# Patient Record
Sex: MALE | Race: WHITE | NOT HISPANIC OR LATINO | Employment: FULL TIME | URBAN - METROPOLITAN AREA
[De-identification: names, ages, dates, MRNs, and addresses within clinical notes are randomized per-mention and may not be internally consistent; named-entity substitution may affect disease eponyms.]

---

## 2017-05-09 ENCOUNTER — ALLSCRIPTS OFFICE VISIT (OUTPATIENT)
Dept: OTHER | Facility: OTHER | Age: 37
End: 2017-05-09

## 2017-05-09 DIAGNOSIS — R07.89 OTHER CHEST PAIN: ICD-10-CM

## 2017-05-09 DIAGNOSIS — R68.82 DECREASED LIBIDO: ICD-10-CM

## 2017-05-09 DIAGNOSIS — R06.09 OTHER FORMS OF DYSPNEA: ICD-10-CM

## 2017-05-09 DIAGNOSIS — F41.9 ANXIETY DISORDER: ICD-10-CM

## 2017-05-13 LAB
A/G RATIO (HISTORICAL): 1.3 (CALC) (ref 1–2.5)
ALBUMIN SERPL BCP-MCNC: 4.5 G/DL (ref 3.6–5.1)
ALP SERPL-CCNC: 92 U/L (ref 40–115)
ALT SERPL W P-5'-P-CCNC: 35 U/L (ref 9–46)
AST SERPL W P-5'-P-CCNC: 26 U/L (ref 10–40)
BILIRUB SERPL-MCNC: 0.7 MG/DL (ref 0.2–1.2)
BUN SERPL-MCNC: 13 MG/DL (ref 7–25)
BUN/CREA RATIO (HISTORICAL): NORMAL (CALC) (ref 6–22)
CALCIUM (ADJUSTED FOR ALBUMIN) (HISTORICAL): 9.2 MG/DL (CALC) (ref 8.6–10.2)
CALCIUM SERPL-MCNC: 9.3 MG/DL (ref 8.6–10.3)
CHLORIDE SERPL-SCNC: 102 MMOL/L (ref 98–110)
CHOLEST SERPL-MCNC: 143 MG/DL (ref 125–200)
CHOLEST/HDLC SERPL: 4.1 (CALC)
CO2 SERPL-SCNC: 27 MMOL/L (ref 20–31)
CREAT SERPL-MCNC: 0.77 MG/DL (ref 0.6–1.35)
DEPRECATED RDW RBC AUTO: 14.4 % (ref 11–15)
EGFR AFRICAN AMERICAN (HISTORICAL): 133 ML/MIN/1.73M2
EGFR-AMERICAN CALC (HISTORICAL): 115 ML/MIN/1.73M2
GAMMA GLOBULIN (HISTORICAL): 3.5 G/DL (CALC) (ref 1.9–3.7)
GLUCOSE (HISTORICAL): 96 MG/DL (ref 65–99)
HCT VFR BLD AUTO: 44.7 % (ref 38.5–50)
HDLC SERPL-MCNC: 35 MG/DL
HGB BLD-MCNC: 14.6 G/DL (ref 13.2–17.1)
LDL CHOLESTEROL (HISTORICAL): 92 MG/DL (CALC)
MCH RBC QN AUTO: 28.1 PG (ref 27–33)
MCHC RBC AUTO-ENTMCNC: 32.6 G/DL (ref 32–36)
MCV RBC AUTO: 86.3 FL (ref 80–100)
NON-HDL-CHOL (CHOL-HDL) (HISTORICAL): 108 MG/DL (CALC)
PLATELET # BLD AUTO: 238 THOUSAND/UL (ref 140–400)
PMV BLD AUTO: 10.2 FL (ref 7.5–12.5)
POTASSIUM SERPL-SCNC: 4.1 MMOL/L (ref 3.5–5.3)
RBC # BLD AUTO: 5.17 MILLION/UL (ref 4.2–5.8)
SODIUM SERPL-SCNC: 138 MMOL/L (ref 135–146)
TESTOSTERONE FREE (HISTORICAL): 48.7 PG/ML (ref 35–155)
TESTOSTERONE TOTAL (HISTORICAL): 283 NG/DL (ref 250–1100)
TOTAL PROTEIN (HISTORICAL): 8 G/DL (ref 6.1–8.1)
TRIGL SERPL-MCNC: 79 MG/DL
TSH SERPL DL<=0.05 MIU/L-ACNC: 1.29 MIU/L (ref 0.4–4.5)
WBC # BLD AUTO: 4.6 THOUSAND/UL (ref 3.8–10.8)

## 2017-05-16 ENCOUNTER — GENERIC CONVERSION - ENCOUNTER (OUTPATIENT)
Dept: OTHER | Facility: OTHER | Age: 37
End: 2017-05-16

## 2017-08-10 ENCOUNTER — ALLSCRIPTS OFFICE VISIT (OUTPATIENT)
Dept: OTHER | Facility: OTHER | Age: 37
End: 2017-08-10

## 2017-08-21 ENCOUNTER — ALLSCRIPTS OFFICE VISIT (OUTPATIENT)
Dept: OTHER | Facility: OTHER | Age: 37
End: 2017-08-21

## 2017-08-25 LAB
CLINICAL COMMENT (HISTORICAL): NORMAL
HEPATITIS A IGM ANTIBODY (HISTORICAL): NORMAL
HEPATITIS B CORE TOTAL ANTIBODY (HISTORICAL): NORMAL
HEPATITIS B SURFACE ANTIGEN (HISTORICAL): NORMAL
HEPATITIS C ANTIBODY (HISTORICAL): NORMAL
HIV AG/AB, 4TH GEN (HISTORICAL): NORMAL
RPR SCREEN (HISTORICAL): NORMAL
SIGNAL TO CUT-OFF (HISTORICAL): 0.09

## 2017-08-27 ENCOUNTER — GENERIC CONVERSION - ENCOUNTER (OUTPATIENT)
Dept: OTHER | Facility: OTHER | Age: 37
End: 2017-08-27

## 2017-09-01 ENCOUNTER — GENERIC CONVERSION - ENCOUNTER (OUTPATIENT)
Dept: OTHER | Facility: OTHER | Age: 37
End: 2017-09-01

## 2018-01-09 NOTE — MISCELLANEOUS
Provider Comments  Provider Comments:   [facility] CELL PHONE ASKED PT TO CALL BACK IF NEEDS TO RESCHEDULE APPT      Signatures   Electronically signed by : CHEKO Mcguire;  Aug 10 2017  8:51PM EST                       (Author)

## 2018-01-12 NOTE — RESULT NOTES
Verified Results  (Q) HIV AB, HIV 1/HIV 2, WESTERN BLOT/IMMUNOBLOT [id number] 12:00AM Caleb Ivan     Test Name Result Flag Reference   HIV AG/AB, 4TH GEN NON-REACTIVE  NON-REACTIVE   HIV-1 antigen and HIV-1/HIV-2 antibodies were not  detected  There is no laboratory evidence of HIV  infection  PLEASE NOTE: This information has been disclosed to  you from records whose confidentiality may be  protected by state law  If your state requires such  protection, then the state law prohibits you from  making any further disclosure of the information  without the specific written consent of the person  to whom it pertains, or as otherwise permitted by law  A general authorization for the release of medical or  other information is NOT sufficient for this purpose  For additional information please refer to  http://CompleteSet/faq/QVS809  (This link is being provided for informational/  educational purposes only )        The performance of this assay has not been clinically  validated in patients less than 3years old  (Q) ACUTE HEPATITIS PANEL (REFL) [id number] 12:00AM Bryan Cintron     Test Name Result Flag Reference   HEPATITIS A IGM NON-REACTIVE  NON-REACTIVE   HEPATITIS B SURFACE ANTIGEN NON-REACTIVE  NON-REACTIVE   HEPATITIS B CORE$ANTIBODY (IGM) NON-REACTIVE  NON-REACTIVE   HEPATITIS C ANTIBODY (REFL) NON-REACTIVE  NON-REACTIVE   SIGNAL TO CUT-OFF 0 09  <1 00   REFLEX TIQ      OUR RECORDS INDICATE THAT YOU HAVE ORDERED ACUTE HEPATITIS PANEL  ORDER CODE 87031  THIS IS A REFLEX-SPECIFIC ORDER CODE  HOWEVER, ONLY THE INITIAL TEST WAS PERFORMED, BECAUSE WE DO NOT  HAVE A REFLEX TESTING AUTHORIZATION FORM ON FILE FOR YOU  TO   PERFORM A REFLEX TEST WE NEED YOU TO SIGN AN AUTHORIZATION FORM   SPECIFYING (A) THE REFLEXIVE TEST AND (B) THE RESULTS THAT WILL   TRIGGER THE PERFORMANCE OF THE REFLEX TEST    PLEASE CONTACT THE    AT dVisit IF YOU WOULD LIKE ADDITIONAL TESTING DONE OR CONTACT YOUR    TO OBTAIN A COPY OF THE REFLEXIVE TESTING AUTHORIZATION FORM  (Q) RPR (DX) W/REFL TITER AND CONFIRMATORY TESTING [id number] 12:00AM Missy Poole     Test Name Result Flag Reference   RPR (DX) W/REFL TITER AND$CONFIRMATORY TESTING NON-REACTIVE  NON-REACTIVE     (Q) CHLAMYDIA/N  GONORRHOEAE DNA, SDA [id number] 12:00AM Caleb Ivan     Test Name Result Flag Reference   CHLAMYDIA TRACHOMATIS$RNA, TMA NOT DETECTED  NOT DETECTED   NEISSERIA [address], TMA NOT DETECTED  NOT DETECTED   This test was performed using the [address]  ([address] )  The analytical performance characteristics of this   assay, when used to test SurePath specimens have  been determined by [employer]

## 2018-01-13 VITALS
DIASTOLIC BLOOD PRESSURE: 90 MMHG | OXYGEN SATURATION: 99 % | RESPIRATION RATE: 20 BRPM | TEMPERATURE: 97.7 F | SYSTOLIC BLOOD PRESSURE: 132 MMHG | BODY MASS INDEX: 29.59 KG/M2 | HEART RATE: 70 BPM | WEIGHT: 195.25 LBS | HEIGHT: 68 IN

## 2018-01-13 NOTE — RESULT NOTES
Verified Results  (Q) CBC (H/H, RBC, INDICES, WBC, PLT) [id number] 12:00AM [facility]     Test Name Result Flag Reference   WHITE BLOOD CELL COUNT 4 6 Thousand/uL  3 8-10 8   RED BLOOD CELL COUNT 5 17 Million/uL  4 20-5 80   HEMOGLOBIN 14 6 g/dL  13 2-17 1   HEMATOCRIT 44 7 %  38 5-50 0   MCV 86 3 fL  80 0-100 0   MCH 28 1 pg  27 0-33 0   MCHC 32 6 g/dL  32 0-36 0   RDW 14 4 %  11 0-15 0   PLATELET COUNT 763 Thousand/uL  140-400   MPV 10 2 fL  7 5-12 5     (Q) COMPREHENSIVE METABOLIC PNL W/ADJUSTED CALCIUM [id number] 12:00AM [facility]     Test Name Result Flag Reference   GLUCOSE 96 mg/dL  65-99   Fasting reference interval   UREA NITROGEN (BUN) 13 mg/dL  7-25   CREATININE 0 77 mg/dL  0 60-1 35   eGFR NON-AFR  AMERICAN 115 mL/min/1 73m2  > OR = 60   eGFR AFRICAN AMERICAN 133 mL/min/1 73m2  > OR = 60   BUN/CREATININE RATIO   8-00   NOT APPLICABLE (calc)   SODIUM 138 mmol/L  135-146   POTASSIUM 4 1 mmol/L  3 5-5 3   CHLORIDE 102 mmol/L     CARBON DIOXIDE 27 mmol/L  20-31   CALCIUM 9 3 mg/dL  8 6-10 3   CALCIUM (ADJUSTED FOR$ALBUMIN) 9 2 mg/dL (calc)  8 6-10 2   PROTEIN, TOTAL 8 0 g/dL  6 1-8 1   ALBUMIN 4 5 g/dL  3 6-5 1   GLOBULIN 3 5 g/dL (calc)  1 9-3 7   ALBUMIN/GLOBULIN RATIO 1 3 (calc)  1 0-2 5   BILIRUBIN, TOTAL 0 7 mg/dL  0 2-1 2   ALKALINE PHOSPHATASE 92 U/L     AST 26 U/L  10-40   ALT 35 U/L  9-46     (Q) LIPID PANEL WITH REFLEX TO DIRECT LDL [id number] 12:00AM [facility]     Test Name Result Flag Reference   CHOLESTEROL, TOTAL 143 mg/dL  125-200   HDL CHOLESTEROL 35 mg/dL L > OR = 40   TRIGLICERIDES 79 mg/dL  <851   LDL-CHOLESTEROL 92 mg/dL (calc)  <130   Desirable range <100 mg/dL for patients with CHD or  diabetes and <70 mg/dL for diabetic patients with  known heart disease  CHOL/HDLC RATIO 4 1 (calc)  < OR = 5 0   NON HDL CHOLESTEROL 108 mg/dL (calc)     Target for non-HDL cholesterol is 30 mg/dL higher than   LDL cholesterol target       (Q) TSH, 3RD GENERATION W/REFLEX TO FT4 [id number] 12:00AM Sonia Venegas     Test Name Result Flag Reference   TSH W/REFLEX TO FT4 1 29 mIU/L  0 40-4 50     (Q) TESTOSTERONE, FREE AND TOTAL, LC/MS/MS [id number] 12:00AM Sonia Venegas     Test Name Result Flag Reference   TESTOSTERONE, TOTAL,$LC/MS/ ng/dL  250-1100   For more information on this test, go to  http://Rocawear/faq/  TotalTestosteroneMSMS        This test was developed and its analytical performance  characteristics have been determined by [address] [place] [address]  It has  not been cleared or approved by the [place]  Food and Drug  Administration  This assay has been validated pursuant  to the CLIA regulations and is used for clinical  purposes  FREE TESTOSTERONE 48 7 pg/mL  35 0-155 0   This test was developed and its analytical performance  characteristics have been determined by [address] [place] [address]  It has  not been cleared or approved by the [place]  Food and Drug  Administration  This assay has been validated pursuant  to the CLIA regulations and is used for clinical  purposes

## 2018-01-14 VITALS
RESPIRATION RATE: 15 BRPM | WEIGHT: 204.5 LBS | TEMPERATURE: 97.5 F | HEIGHT: 68 IN | SYSTOLIC BLOOD PRESSURE: 122 MMHG | DIASTOLIC BLOOD PRESSURE: 82 MMHG | BODY MASS INDEX: 30.99 KG/M2 | HEART RATE: 66 BPM | OXYGEN SATURATION: 97 %

## 2018-01-22 VITALS
HEIGHT: 68 IN | DIASTOLIC BLOOD PRESSURE: 90 MMHG | BODY MASS INDEX: 28.98 KG/M2 | TEMPERATURE: 99 F | OXYGEN SATURATION: 97 % | WEIGHT: 191.19 LBS | RESPIRATION RATE: 18 BRPM | SYSTOLIC BLOOD PRESSURE: 130 MMHG | HEART RATE: 79 BPM

## 2018-02-12 DIAGNOSIS — N52.9 ERECTILE DYSFUNCTION, UNSPECIFIED ERECTILE DYSFUNCTION TYPE: Primary | ICD-10-CM

## 2018-02-12 RX ORDER — SILDENAFIL 100 MG/1
100 TABLET, FILM COATED ORAL AS NEEDED
Qty: 8 TABLET | Refills: 0 | Status: SHIPPED | OUTPATIENT
Start: 2018-02-12 | End: 2018-03-28 | Stop reason: SDUPTHER

## 2018-02-12 RX ORDER — SILDENAFIL 100 MG/1
TABLET, FILM COATED ORAL
COMMUNITY
Start: 2017-09-01 | End: 2018-02-12 | Stop reason: SDUPTHER

## 2018-03-28 DIAGNOSIS — N52.9 ERECTILE DYSFUNCTION, UNSPECIFIED ERECTILE DYSFUNCTION TYPE: ICD-10-CM

## 2018-03-28 RX ORDER — SILDENAFIL 100 MG/1
100 TABLET, FILM COATED ORAL AS NEEDED
Qty: 8 TABLET | Refills: 0 | Status: SHIPPED | OUTPATIENT
Start: 2018-03-28 | End: 2018-11-16 | Stop reason: SDUPTHER

## 2018-08-01 DIAGNOSIS — N52.9 ERECTILE DYSFUNCTION, UNSPECIFIED ERECTILE DYSFUNCTION TYPE: ICD-10-CM

## 2018-08-02 RX ORDER — SILDENAFIL 100 MG/1
100 TABLET, FILM COATED ORAL AS NEEDED
Qty: 8 TABLET | Refills: 0 | Status: SHIPPED | OUTPATIENT
Start: 2018-08-02 | End: 2019-01-07 | Stop reason: SDUPTHER

## 2018-10-14 ENCOUNTER — TELEPHONE (OUTPATIENT)
Dept: FAMILY MEDICINE CLINIC | Facility: CLINIC | Age: 38
End: 2018-10-14

## 2018-10-14 NOTE — TELEPHONE ENCOUNTER
Neda Mara is scheduled for an annual physical on 10/24/2018 with you  He is asking if you can please place the orders so he may have fasting blood work prior to his appointment  Pt will being going to the [facility]'s lab on [address] once orders are placed please respond so we may call pt and advise him his order have been placed he does need to go on a Sunday morning   Pt's best number is [phone]

## 2018-10-16 DIAGNOSIS — Z13.220 LIPID SCREENING: ICD-10-CM

## 2018-10-16 DIAGNOSIS — R53.83 FATIGUE, UNSPECIFIED TYPE: Primary | ICD-10-CM

## 2018-10-16 PROBLEM — R68.82 REDUCED LIBIDO: Status: ACTIVE | Noted: 2017-05-09

## 2018-10-16 PROBLEM — R07.89 ATYPICAL CHEST PAIN: Status: ACTIVE | Noted: 2017-05-09

## 2018-10-16 PROBLEM — R06.09 DYSPNEA ON EXERTION: Status: ACTIVE | Noted: 2017-05-09

## 2018-10-16 PROBLEM — R07.89 CHEST TIGHTNESS OR PRESSURE: Status: ACTIVE | Noted: 2017-05-09

## 2018-10-16 PROBLEM — R29.898 COMPLAINTS OF WEAKNESS OF LOWER EXTREMITY: Status: ACTIVE | Noted: 2017-05-11

## 2018-10-16 PROBLEM — R06.00 DYSPNEA ON EXERTION: Status: ACTIVE | Noted: 2017-05-09

## 2018-10-16 PROBLEM — N52.9 MALE ERECTILE DYSFUNCTION: Status: ACTIVE | Noted: 2017-09-01

## 2018-10-16 PROBLEM — F41.9 ANXIETY: Status: ACTIVE | Noted: 2017-05-09

## 2018-10-17 NOTE — TELEPHONE ENCOUNTER
I called Kayla Leone  and advised his fasting blood work orders have been placed and pt preferred to push his physical back a week to make sure Lottie Izaguirre will have the results in time  Pt is scheduled for physical on 11/01/2018 with Caleb at 4:30 pm  Pt was asked to arrive 15 minutes prior

## 2018-10-23 ENCOUNTER — TELEPHONE (OUTPATIENT)
Dept: FAMILY MEDICINE CLINIC | Facility: CLINIC | Age: 38
End: 2018-10-23

## 2018-10-23 NOTE — TELEPHONE ENCOUNTER
----- Message from Carlos Soria sent at 10/22/2018  2:12 PM EDT -----  Regarding: Appointment Change  Please call patient  Iesha Daughters has to leave by 4:30pm on 11/01/18  Can patient come in at 4:00 for his appointment? I did already adjust on the schedule

## 2018-10-23 NOTE — TELEPHONE ENCOUNTER
Called pt, GOYO asking him to call back and confirm appointment time can be changed on 11/1 from 4:30 to 4pm

## 2018-10-30 ENCOUNTER — TELEPHONE (OUTPATIENT)
Dept: FAMILY MEDICINE CLINIC | Facility: CLINIC | Age: 38
End: 2018-10-30

## 2018-10-30 NOTE — TELEPHONE ENCOUNTER
----- Message from Anuel Eng sent at 10/30/2018  1:33 PM EDT -----  Regarding: Appointment Rescheudule  Patient is scheduled on Thursday with Diann Princeo - He will not be in the office on Thursday afternoon  Please reschedule his appointment  Any of the above

## 2018-11-04 ENCOUNTER — APPOINTMENT (OUTPATIENT)
Dept: LAB | Facility: MEDICAL CENTER | Age: 38
End: 2018-11-04
Payer: COMMERCIAL

## 2018-11-04 DIAGNOSIS — R53.83 FATIGUE, UNSPECIFIED TYPE: ICD-10-CM

## 2018-11-04 DIAGNOSIS — Z13.220 LIPID SCREENING: ICD-10-CM

## 2018-11-04 LAB
ALBUMIN SERPL BCP-MCNC: 4.1 G/DL (ref 3.5–5)
ALP SERPL-CCNC: 65 U/L (ref 46–116)
ALT SERPL W P-5'-P-CCNC: 45 U/L (ref 12–78)
ANION GAP SERPL CALCULATED.3IONS-SCNC: 4 MMOL/L (ref 4–13)
AST SERPL W P-5'-P-CCNC: 26 U/L (ref 5–45)
BASOPHILS # BLD AUTO: 0.04 THOUSANDS/ΜL (ref 0–0.1)
BASOPHILS NFR BLD AUTO: 1 % (ref 0–1)
BILIRUB SERPL-MCNC: 0.49 MG/DL (ref 0.2–1)
BUN SERPL-MCNC: 22 MG/DL (ref 5–25)
CALCIUM SERPL-MCNC: 8.9 MG/DL (ref 8.3–10.1)
CHLORIDE SERPL-SCNC: 101 MMOL/L (ref 100–108)
CHOLEST SERPL-MCNC: 167 MG/DL (ref 50–200)
CO2 SERPL-SCNC: 30 MMOL/L (ref 21–32)
CREAT SERPL-MCNC: 0.9 MG/DL (ref 0.6–1.3)
EOSINOPHIL # BLD AUTO: 0.27 THOUSAND/ΜL (ref 0–0.61)
EOSINOPHIL NFR BLD AUTO: 5 % (ref 0–6)
ERYTHROCYTE [DISTWIDTH] IN BLOOD BY AUTOMATED COUNT: 12.9 % (ref 11.6–15.1)
GFR SERPL CREATININE-BSD FRML MDRD: 107 ML/MIN/1.73SQ M
GLUCOSE P FAST SERPL-MCNC: 92 MG/DL (ref 65–99)
HCT VFR BLD AUTO: 44.1 % (ref 36.5–49.3)
HDLC SERPL-MCNC: 48 MG/DL (ref 40–60)
HGB BLD-MCNC: 14.6 G/DL (ref 12–17)
IMM GRANULOCYTES # BLD AUTO: 0.01 THOUSAND/UL (ref 0–0.2)
IMM GRANULOCYTES NFR BLD AUTO: 0 % (ref 0–2)
LDLC SERPL CALC-MCNC: 105 MG/DL (ref 0–100)
LYMPHOCYTES # BLD AUTO: 1.88 THOUSANDS/ΜL (ref 0.6–4.47)
LYMPHOCYTES NFR BLD AUTO: 32 % (ref 14–44)
MCH RBC QN AUTO: 29 PG (ref 26.8–34.3)
MCHC RBC AUTO-ENTMCNC: 33.1 G/DL (ref 31.4–37.4)
MCV RBC AUTO: 88 FL (ref 82–98)
MONOCYTES # BLD AUTO: 0.44 THOUSAND/ΜL (ref 0.17–1.22)
MONOCYTES NFR BLD AUTO: 7 % (ref 4–12)
NEUTROPHILS # BLD AUTO: 3.27 THOUSANDS/ΜL (ref 1.85–7.62)
NEUTS SEG NFR BLD AUTO: 55 % (ref 43–75)
NONHDLC SERPL-MCNC: 119 MG/DL
NRBC BLD AUTO-RTO: 0 /100 WBCS
PLATELET # BLD AUTO: 220 THOUSANDS/UL (ref 149–390)
PMV BLD AUTO: 10.6 FL (ref 8.9–12.7)
POTASSIUM SERPL-SCNC: 4 MMOL/L (ref 3.5–5.3)
PROT SERPL-MCNC: 8.5 G/DL (ref 6.4–8.2)
RBC # BLD AUTO: 5.03 MILLION/UL (ref 3.88–5.62)
SODIUM SERPL-SCNC: 135 MMOL/L (ref 136–145)
TRIGL SERPL-MCNC: 68 MG/DL
TSH SERPL DL<=0.05 MIU/L-ACNC: 1.96 UIU/ML (ref 0.36–3.74)
WBC # BLD AUTO: 5.91 THOUSAND/UL (ref 4.31–10.16)

## 2018-11-04 PROCEDURE — 85025 COMPLETE CBC W/AUTO DIFF WBC: CPT

## 2018-11-04 PROCEDURE — 36415 COLL VENOUS BLD VENIPUNCTURE: CPT

## 2018-11-04 PROCEDURE — 84443 ASSAY THYROID STIM HORMONE: CPT

## 2018-11-04 PROCEDURE — 80053 COMPREHEN METABOLIC PANEL: CPT

## 2018-11-04 PROCEDURE — 80061 LIPID PANEL: CPT

## 2018-11-16 ENCOUNTER — OFFICE VISIT (OUTPATIENT)
Dept: FAMILY MEDICINE CLINIC | Facility: CLINIC | Age: 38
End: 2018-11-16
Payer: COMMERCIAL

## 2018-11-16 VITALS
RESPIRATION RATE: 16 BRPM | SYSTOLIC BLOOD PRESSURE: 116 MMHG | BODY MASS INDEX: 30.4 KG/M2 | HEART RATE: 74 BPM | DIASTOLIC BLOOD PRESSURE: 88 MMHG | WEIGHT: 200.6 LBS | HEIGHT: 68 IN | TEMPERATURE: 99.6 F | OXYGEN SATURATION: 98 %

## 2018-11-16 DIAGNOSIS — Z00.00 ROUTINE ADULT HEALTH MAINTENANCE: Primary | ICD-10-CM

## 2018-11-16 DIAGNOSIS — G47.30 SLEEP APNEA, UNSPECIFIED TYPE: ICD-10-CM

## 2018-11-16 DIAGNOSIS — M25.50 ARTHRALGIA, UNSPECIFIED JOINT: ICD-10-CM

## 2018-11-16 PROCEDURE — 99395 PREV VISIT EST AGE 18-39: CPT | Performed by: FAMILY MEDICINE

## 2018-11-19 DIAGNOSIS — G47.30 SLEEP APNEA, UNSPECIFIED TYPE: Primary | ICD-10-CM

## 2018-11-20 NOTE — PROGRESS NOTES
Assessment/Plan:  Patient's physical exam is fairly unremarkable  He appears to be in good health however his sleep issues need to be further evaluated  We will send for a sleep study to rule out obstructive sleep apnea  Secondly the etiology of his joint pain is unclear  Will check routine blood work for connective tissue disorders  He is up-to-date on immunizations and is not due for any age-related screenings at this time  Diagnoses and all orders for this visit:    Routine adult health maintenance    Sleep apnea, unspecified type  -     Home Study; Future    Arthralgia, unspecified joint  -     JUAN LUIS Screen w/ Reflex to Titer/Pattern; Future  -     C-reactive protein; Future  -     Lyme Antibody Profile with reflex to WB; Future  -     RF Screen w/ Reflex to Titer; Future  -     Sedimentation rate, automated; Future  -     Uric acid; Future          Subjective:      Patient ID: Karen Carrillo is a 43 y o  male  Karen Carrillo presents for health maintenance visit   43 y o   male    He/she states that  level of health is:  good     Dental issues :no    Vision issues:  Wears glasses    Hearing issues: no    Up-to-date on immunizations: yes    Diet: fair     Exercise:  intermittently    Tobacco: no    ETOH: Minimal     Illegal drugs: no    Patient presents with his wife for routine annual physical   Overall he feels his health is fairly good though he does have 2 concerns  First there issues with his sleeping  His wife notes that he snores loudly and she believe she has witnessed several apneic episodes  He does have daytime somnolence  Secondly he has over the last 3-6 months developed progressive arthralgias in multiple joints  He has no swelling no rashes and no unexplained fevers          The following portions of the patient's history were reviewed and updated as appropriate: allergies, current medications, past family history, past medical history, past social history, past surgical history and problem list     Review of Systems   Constitutional: Positive for fatigue  HENT: Negative  Negative for congestion, ear pain, hearing loss, nosebleeds, sore throat and trouble swallowing  Eyes: Negative  Respiratory: Negative for apnea, cough, chest tightness, shortness of breath and wheezing  Cardiovascular: Negative  Gastrointestinal: Negative for abdominal pain, blood in stool, constipation, diarrhea, nausea and vomiting  Endocrine: Negative  Genitourinary: Negative for difficulty urinating, dysuria, frequency, hematuria and urgency  Musculoskeletal: Positive for arthralgias  Negative for joint swelling and myalgias  Skin: Negative for rash  Neurological: Negative for dizziness, syncope, light-headedness, numbness and headaches  Hematological: Negative  Psychiatric/Behavioral: Negative for confusion and dysphoric mood  The patient is not nervous/anxious  Objective:      /88 (BP Location: Left arm, Patient Position: Sitting, Cuff Size: Large)   Pulse 74   Temp 99 6 °F (37 6 °C) (Tympanic)   Resp 16   Ht 5' 7 5" (1 715 m)   Wt 91 kg (200 lb 9 6 oz)   SpO2 98%   BMI 30 95 kg/m²          Physical Exam   Constitutional: He is oriented to person, place, and time  He appears well-developed and well-nourished  HENT:   Head: Normocephalic and atraumatic  Right Ear: Hearing, tympanic membrane and external ear normal    Left Ear: Hearing, tympanic membrane, external ear and ear canal normal    Nose: Nose normal  No rhinorrhea or nasal deformity  Right sinus exhibits no maxillary sinus tenderness and no frontal sinus tenderness  Left sinus exhibits no maxillary sinus tenderness and no frontal sinus tenderness  Mouth/Throat: Uvula is midline, oropharynx is clear and moist and mucous membranes are normal  No oral lesions  Normal dentition  No oropharyngeal exudate or posterior oropharyngeal erythema  Eyes: Pupils are equal, round, and reactive to light  Conjunctivae, EOM and lids are normal    Neck: Trachea normal and normal range of motion  Neck supple  No JVD present  Carotid bruit is not present  No thyroid mass and no thyromegaly present  Cardiovascular: Normal rate, regular rhythm, intact distal pulses and normal pulses  No murmur heard  Pulses:       Carotid pulses are 2+ on the right side, and 2+ on the left side  Radial pulses are 2+ on the right side, and 2+ on the left side  Pulmonary/Chest: Breath sounds normal  No accessory muscle usage  No respiratory distress  Abdominal: Soft  Normal appearance, normal aorta and bowel sounds are normal  He exhibits no distension, no ascites and no mass  There is no hepatosplenomegaly  There is no tenderness  There is no CVA tenderness  No hernia  Musculoskeletal: Normal range of motion  He exhibits no tenderness or deformity  Lymphadenopathy:     He has no cervical adenopathy  Neurological: He is alert and oriented to person, place, and time  He has normal strength and normal reflexes  No cranial nerve deficit or sensory deficit  He displays a negative Romberg sign  Skin: Skin is warm, dry and intact  No lesion and no rash noted  Psychiatric: He has a normal mood and affect  His speech is normal and behavior is normal  Judgment and thought content normal  Cognition and memory are normal    Nursing note and vitals reviewed

## 2018-11-23 ENCOUNTER — HOSPITAL ENCOUNTER (OUTPATIENT)
Dept: SLEEP CENTER | Facility: CLINIC | Age: 38
Discharge: HOME | End: 2018-11-23
Payer: COMMERCIAL

## 2018-11-23 ENCOUNTER — APPOINTMENT (OUTPATIENT)
Dept: LAB | Facility: MEDICAL CENTER | Age: 38
End: 2018-11-23
Payer: COMMERCIAL

## 2018-11-23 DIAGNOSIS — M25.50 ARTHRALGIA, UNSPECIFIED JOINT: ICD-10-CM

## 2018-11-23 DIAGNOSIS — G47.30 SLEEP APNEA, UNSPECIFIED TYPE: ICD-10-CM

## 2018-11-23 LAB
CRP SERPL QL: <3 MG/L
ERYTHROCYTE [SEDIMENTATION RATE] IN BLOOD: 8 MM/HOUR (ref 0–10)
URATE SERPL-MCNC: 6.5 MG/DL (ref 4.2–8)

## 2018-11-23 PROCEDURE — 84550 ASSAY OF BLOOD/URIC ACID: CPT

## 2018-11-23 PROCEDURE — 85652 RBC SED RATE AUTOMATED: CPT

## 2018-11-23 PROCEDURE — 86038 ANTINUCLEAR ANTIBODIES: CPT

## 2018-11-23 PROCEDURE — 36415 COLL VENOUS BLD VENIPUNCTURE: CPT

## 2018-11-23 PROCEDURE — 86618 LYME DISEASE ANTIBODY: CPT

## 2018-11-23 PROCEDURE — G0399 HOME SLEEP TEST/TYPE 3 PORTA: HCPCS

## 2018-11-23 PROCEDURE — 86430 RHEUMATOID FACTOR TEST QUAL: CPT

## 2018-11-23 PROCEDURE — 86140 C-REACTIVE PROTEIN: CPT

## 2018-11-26 LAB
B BURGDOR IGG SER IA-ACNC: 0.17
B BURGDOR IGM SER IA-ACNC: 0.23
RHEUMATOID FACT SER QL LA: NEGATIVE
RYE IGE QN: NEGATIVE

## 2018-11-29 ENCOUNTER — TELEPHONE (OUTPATIENT)
Dept: SLEEP CENTER | Facility: CLINIC | Age: 38
End: 2018-11-29

## 2018-11-29 NOTE — TELEPHONE ENCOUNTER
Left message that home sleep study is resulted and for patient to call and make NP consult appt with Dr Mak Lester

## 2018-12-06 ENCOUNTER — OFFICE VISIT (OUTPATIENT)
Dept: FAMILY MEDICINE CLINIC | Facility: CLINIC | Age: 38
End: 2018-12-06
Payer: COMMERCIAL

## 2018-12-06 VITALS
OXYGEN SATURATION: 94 % | DIASTOLIC BLOOD PRESSURE: 88 MMHG | RESPIRATION RATE: 15 BRPM | SYSTOLIC BLOOD PRESSURE: 132 MMHG | BODY MASS INDEX: 31.83 KG/M2 | WEIGHT: 202.8 LBS | TEMPERATURE: 99.2 F | HEIGHT: 67 IN | HEART RATE: 94 BPM

## 2018-12-06 DIAGNOSIS — A08.4 VIRAL GASTROENTERITIS: Primary | ICD-10-CM

## 2018-12-06 PROCEDURE — 99213 OFFICE O/P EST LOW 20 MIN: CPT | Performed by: NURSE PRACTITIONER

## 2018-12-06 NOTE — LETTER
December 6, 2018     Patient: Alma Fortune   YOB: 1980   Date of Visit: 12/6/2018       To Whom it May Concern:    Andrea Hooks is under my professional care  He was seen in my office on 12/6/2018  He may return on 12/8/2018  If you have any questions or concerns, please don't hesitate to call           Sincerely,          CLINT Simmons        CC: No Recipients

## 2018-12-06 NOTE — LETTER
December 6, 2018     Patient: Hellen Butts   YOB: 1980   Date of Visit: 12/6/2018       To Whom it May Concern:    Mary Jo Deejay is under my professional care  He was seen in my office on 12/6/2018  He may return on 12/7/2018  If you have any questions or concerns, please don't hesitate to call           Sincerely,          CLINT Syed        CC: No Recipients

## 2018-12-08 NOTE — PROGRESS NOTES
[facility]    ASSESSMENT AND PLAN     1  Viral gastroenteritis  Patient presents today with signs and symptoms suggestive of a viral gastroenteritis  Symptom management reviewed: Increased fluids/maintain hydration, rest, BRAT diet, may take Tylenol and/or ibuprofen for headache  Patient declined Rx for Zofran  Patient missed work today, note given  He is to return to the office if his symptoms persist or worsen  SUBJECTIVE       Patient ID: Nancy Jimenez is a 43 y o  male  Chief Complaint   Patient presents with    Vomiting     Pt c/o vomiting, headaches with low grade fevers  Pt states sxs started at 1:30 am today  Pt denies diarrhea  HISTORY OF PRESENT ILLNESS    Patient presents today with complaint of GI distress, vomiting, headache, and low-grade fever that started during the night  He denies vomiting, constipation and/or diarrhea  He does not recall eating anything unusual yesterday  Several coworkers have been sick recently  He called out of work today  The following portions of the patient's history were reviewed and updated as appropriate: allergies, current medications and past medical history  REVIEW OF SYSTEMS  Review of Systems   Constitutional: Positive for fever  Negative for chills and diaphoresis  HENT: Negative  Respiratory: Negative  Cardiovascular: Negative  Gastrointestinal: Positive for abdominal pain, nausea and vomiting  Negative for abdominal distention, constipation, diarrhea and rectal pain  Genitourinary: Negative  Musculoskeletal: Negative  Skin: Negative  Neurological: Positive for headaches  Negative for dizziness and light-headedness  Psychiatric/Behavioral: Negative          OBJECTIVE      VITAL SIGNS  /88 (BP Location: Left arm, Patient Position: Sitting, Cuff Size: Adult)   Pulse 94   Temp 99 2 °F (37 3 °C) (Tympanic)   Resp 15   Ht 5' 6 73" (1 695 m)   Wt 92 kg (202 lb 12 8 oz)   SpO2 94%   BMI 32 02 kg/m²       PHYSICAL EXAMINATION   Physical Exam   Constitutional: He appears well-developed and well-nourished  Cardiovascular: Normal rate, regular rhythm and normal heart sounds  Pulmonary/Chest: Effort normal and breath sounds normal  No respiratory distress  He has no wheezes  Abdominal: Soft  Bowel sounds are normal  He exhibits no distension  There is no tenderness  There is no rebound and no guarding  Nursing note and vitals reviewed

## 2018-12-31 ENCOUNTER — OFFICE VISIT (OUTPATIENT)
Dept: FAMILY MEDICINE CLINIC | Facility: CLINIC | Age: 38
End: 2018-12-31
Payer: COMMERCIAL

## 2018-12-31 VITALS
SYSTOLIC BLOOD PRESSURE: 132 MMHG | HEART RATE: 69 BPM | BODY MASS INDEX: 32.66 KG/M2 | DIASTOLIC BLOOD PRESSURE: 76 MMHG | TEMPERATURE: 98.3 F | RESPIRATION RATE: 17 BRPM | WEIGHT: 208.1 LBS | HEIGHT: 67 IN | OXYGEN SATURATION: 97 %

## 2018-12-31 DIAGNOSIS — J32.9 SINUSITIS, UNSPECIFIED CHRONICITY, UNSPECIFIED LOCATION: Primary | ICD-10-CM

## 2018-12-31 PROCEDURE — 99213 OFFICE O/P EST LOW 20 MIN: CPT | Performed by: NURSE PRACTITIONER

## 2018-12-31 RX ORDER — AZITHROMYCIN 250 MG/1
TABLET, FILM COATED ORAL
Qty: 6 TABLET | Refills: 0 | Status: SHIPPED | OUTPATIENT
Start: 2018-12-31 | End: 2019-01-04

## 2018-12-31 NOTE — LETTER
December 31, 2018     Patient: Lex Sauceda   YOB: 1980   Date of Visit: 12/31/2018       To Whom it May Concern:    Anabel Rojas is under my professional care  He was seen in my office on 12/31/2018  He may return on 1/2/2019  If you have any questions or concerns, please don't hesitate to call           Sincerely,          CLINT Fong        CC: No Recipients

## 2018-12-31 NOTE — PROGRESS NOTES
[facility]    ASSESSMENT AND PLAN     1  Sinusitis, unspecified chronicity, unspecified location  69-year-old male presents today with signs and symptoms suggestive of a sinusitis  Physical assessment is as documented below  Rx for azithromycin given today  Symptom management reviewed: Increased rest, increase fluids, may take OTC antitussive/expectorant, may take Tylenol and/or ibuprofen, warm salt water gargles  He is to return to the office if his symptoms persist and/or worsen  He missed work today, note given  - azithromycin (ZITHROMAX) 250 mg tablet; Take 2 tablets today then 1 tablet daily x 4 days  Dispense: 6 tablet; Refill: 0            SUBJECTIVE       Patient ID: Joselyn Calvo is a 43 y o  male  Chief Complaint   Patient presents with    Headache     x2d Pt states he took otc Allergy med and Advil w/ little relief   Sore Throat     x2d    Nasal Congestion     x2d       HISTORY OF PRESENT ILLNESS    Patient presents today with sinus pain/pressure, nasal congestion, headache, sore throat with difficulty swallowing  Symptoms started Saturday and have worsened last day or 2  He has trialed at a antihistamine and Advil with minimal relief  He had to miss work today  The following portions of the patient's history were reviewed and updated as appropriate: allergies, current medications, past medical history and problem list     REVIEW OF SYSTEMS  Review of Systems   Constitutional: Positive for fatigue  HENT: Positive for congestion, ear pain, postnasal drip, sinus pain, sinus pressure, sore throat, trouble swallowing and voice change  Negative for ear discharge  Respiratory: Negative  Cardiovascular: Negative  Gastrointestinal: Negative  Genitourinary: Negative  Neurological: Positive for headaches  Negative for dizziness and light-headedness  Psychiatric/Behavioral: Negative          OBJECTIVE      VITAL SIGNS  /76 (BP Location: Left arm, Patient Position: Sitting, Cuff Size: Standard)   Pulse 69   Temp 98 3 °F (36 8 °C) (Tympanic Core)   Resp 17   Ht 5' 7 2" (1 707 m)   Wt 94 4 kg (208 lb 1 6 oz)   SpO2 97%   BMI 32 40 kg/m²       PHYSICAL EXAMINATION   Physical Exam   Constitutional: He is oriented to person, place, and time  He appears well-developed and well-nourished  HENT:   Head: Normocephalic  Right Ear: Hearing, tympanic membrane, external ear and ear canal normal  No middle ear effusion  Left Ear: Hearing, tympanic membrane, external ear and ear canal normal   No middle ear effusion  Nose: Mucosal edema present  Right sinus exhibits maxillary sinus tenderness and frontal sinus tenderness  Left sinus exhibits maxillary sinus tenderness and frontal sinus tenderness  Mouth/Throat: Mucous membranes are dry  Oropharyngeal exudate and posterior oropharyngeal erythema present  Eyes: Right eye exhibits no discharge  Left eye exhibits no discharge  Cardiovascular: Normal rate and regular rhythm  Pulmonary/Chest: Effort normal and breath sounds normal  No respiratory distress  Musculoskeletal: Normal range of motion  Lymphadenopathy:        Head (right side): No submental and no submandibular adenopathy present  Head (left side): No submental and no submandibular adenopathy present  He has no cervical adenopathy  Neurological: He is alert and oriented to person, place, and time  Skin: Skin is warm, dry and intact  Psychiatric: He has a normal mood and affect  His speech is normal and behavior is normal  Judgment and thought content normal  Cognition and memory are normal    Nursing note and vitals reviewed

## 2019-01-07 DIAGNOSIS — N52.9 ERECTILE DYSFUNCTION, UNSPECIFIED ERECTILE DYSFUNCTION TYPE: ICD-10-CM

## 2019-01-07 RX ORDER — SILDENAFIL 100 MG/1
100 TABLET, FILM COATED ORAL AS NEEDED
Qty: 8 TABLET | Refills: 5 | Status: SHIPPED | OUTPATIENT
Start: 2019-01-07 | End: 2020-09-14 | Stop reason: ALTCHOICE

## 2019-01-18 ENCOUNTER — OFFICE VISIT (OUTPATIENT)
Dept: SLEEP CENTER | Facility: CLINIC | Age: 39
End: 2019-01-18
Payer: COMMERCIAL

## 2019-01-18 VITALS
DIASTOLIC BLOOD PRESSURE: 78 MMHG | HEIGHT: 67 IN | OXYGEN SATURATION: 97 % | BODY MASS INDEX: 32.4 KG/M2 | HEART RATE: 61 BPM | SYSTOLIC BLOOD PRESSURE: 116 MMHG

## 2019-01-18 DIAGNOSIS — F51.12 INSUFFICIENT SLEEP SYNDROME: ICD-10-CM

## 2019-01-18 DIAGNOSIS — G47.9 SLEEP DISTURBANCE: ICD-10-CM

## 2019-01-18 DIAGNOSIS — E66.9 OBESITY (BMI 30-39.9): ICD-10-CM

## 2019-01-18 DIAGNOSIS — G47.33 OBSTRUCTIVE SLEEP APNEA SYNDROME: Primary | ICD-10-CM

## 2019-01-18 DIAGNOSIS — K21.9 GASTROESOPHAGEAL REFLUX DISEASE WITHOUT ESOPHAGITIS: ICD-10-CM

## 2019-01-18 DIAGNOSIS — F45.8 BRUXISM: ICD-10-CM

## 2019-01-18 DIAGNOSIS — G47.10 HYPERSOMNIA: ICD-10-CM

## 2019-01-18 PROCEDURE — 99244 OFF/OP CNSLTJ NEW/EST MOD 40: CPT | Performed by: INTERNAL MEDICINE

## 2019-01-18 NOTE — PROGRESS NOTES
Review of Systems      Genitourinary none   Cardiology none   Gastrointestinal frequent heartburn/acid reflux   Neurology awaken with headache, need to move extremities, muscle weakness, numbness/tingling of an extremity, forgetfulness, poor concentration or confusion,  and difficulty with memory   Constitutional fatigue and weight change   Integumentary rash or dry skin and itching   Psychiatry none   Musculoskeletal joint pain, muscle aches and back pain   Pulmonary snoring and difficulty breathing when lying flat    ENT throat clearing   Endocrine none   Hematological none

## 2019-01-18 NOTE — PROGRESS NOTES
Consultation - [address]  43 y o  male  :1980  TGE:[id number]    Physician Requesting Consult: Tamara Morales DO     Reason for Consult : [At your kind request] I saw this patient for initial sleep evaluation today  A home sleep study was undertaken to evaluate for sleep disordered breathing and   patient is here to review results and further options  The study demonstrated a VAMSI (respiratory event index of) 9 4 /hour  Minimum oxygen saturation was was 81% [and 1 9% of the study was spent with saturations less than 90%  ]  The snore index was 0 4%  PFSH, Problem List, Medications & Allergies were reviewed in EMR  He  has a past medical history of Migraine with aura  He has a current medication list which includes the following prescription(s): sildenafil  HPI:  Study was undertaken because of his complaints of disrupted sleep and excessive sleepiness of several years duration  Bed partner reports loud snoring and has witnessed apneas  On occasion he has a woken himself with snoring or choking  Symptoms are improved somewhat by sleeping in the lateral position  Other Complaints: [none]  Restless Leg Syndrome: reports no suggestive symptoms    Parasomnia activity: reports teeth grinding during sleep and at times feels his acting out dream content  There is no report of sleep walking, injury to himself or bed partner  Sleep Routine: Typical Bedtime:  10:30 p m  Gets OOB:  5:00 a m   [TIB:6 5 hrs] Sleep latency:< 15 minutes Sleep Interruptions: infrequent x/night [but he moves excessively during sleep]  Awakens: with the aid of an alarm and feels not always refreshed[[email or web address] hrs] He has Excessive Daytime Sleepiness and dozes off unintentionally when sedentary  At times he is struggles with driving and would zoned out  [place] Sleepiness Scale rated at Total score: 20 /24  Habits: reports that he has never smoked   He has never used smokeless tobacco , reports that he drinks alcohol , [ reports that he does not use drugs  ],Caffeine use: excessive up until bedtime, Exercise routine: none but is physically active at work  Family History: [Negative for sleep disturbance ]  ROS: reviewed & as attached  [Significant for weight gain of around 40 lb in the past 6 months  He reports acid reflux  He reported no nasal respiratory symptoms ]  He has morning headaches but infrequently  He has musculoskeletal aches and pains  He is on Viagra for erectile dysfunction  EXAM:    Vitals /78   Pulse 61   Ht 5' 7 2" (1 707 m)   SpO2 97%   BMI 32 40 kg/m²     General  [Well] groomed male, appears stated age, in [no apparent] distress  Psychiatric  Alert and cooperative  [Mental state appears normal ] Judgement & Insight  [good]   Head   Craniofacial anatomy:narrow facies Sinuses: [non-] tender  TMJ: [Normal]     Eyes   EOM's intact, conjunctiva/corneas clear         Nasal Airway  narrow nares Septum:[central], Mucous membranes:appear [normal]  Turbinates:  are [normal ] There is [no] rhinorrhea; [No] PND     Oral   Airway   laterally narrowed Tongue:Modified Mallampati class II (hard and soft palate, upper portion of tonsils and uvula visible)  Palate:  redundant soft palateTonsils: [no] hypertrophy  Teeth: normal       Neck    is lean; Neck Circumference: 36 5 (cm)cm; Supple; [no] abnormal masses; Thyroid:[normal]  Trachea:[central]      Lymph    [No] Cervical [or] Submandibular Lymhadenopathy   Heart:    RRR; S1,S2 normal; [no] gallop; [no]murmur[s]     Lungs   Respiratory Effort:[normal]  Air entry [good] [bilaterally]  [No] wheezes  [No] rales   Abdomen   Obese, Soft & non-tender     Extremities   [No] pedal edema  [No] clubbing or cyanosis  Skin   Skin is warm and dry; Color& Hydration [good]; [no] facial rashes or lesions    Neurologic  Speech is clear and coherent  CNII-XII intact  Rombergs [Negative]      Ricardo Reyes Muscle bulk, tone and power [WNL] Gait:[normal]          IMPRESSION: Primary Sleep/Secondary(to Medical or Psych conditions) & comorbidities   1  Obstructive sleep apnea syndrome  Ambulatory referral to Sleep Medicine    Cpap DME   2  Sleep disturbance     3  Hypersomnia     4  Insufficient sleep syndrome     5  Bruxism     6  Obesity (BMI 30-39 9)     7  Gastroesophageal reflux disease without esophagitis        PLAN:   1  I reviewed results of the Sleep study with the patient  2  With respect to above conditions, I counseled on pathophysiology, diagnosis, treatment options, risks and benefits; inter-relationship and effects on symptoms and comorbidities; risks of no treatment; costs and insurance aspects  3   Patient elected positive airway pressure therapy but declined an in-lab titration study because of his work schedule and he felt he would not be able to sleep in the lab  He wanted to try auto titrating Pap  I provided a prescription with pressure to be set between 6 - 16 cm H2O  4  I advised on sleep hygiene specifically avoiding caffeine use after 3:00 p m  And alcohol use close to bedtime  4  He is also interested in surgical options and plans to see ENT  5  I advised weight reduction  6  Also advised allowing sufficient opportunity for sleep  5  Follow-up to be scheduled after the study to review results and to initiate therapy           Sincerely,     Authenticated electronically by Reshma Carlisle MD   on 05/32/13   Board Certified Specialist

## 2019-01-18 NOTE — PATIENT INSTRUCTIONS
What is MARY? Obstructive sleep apnea is a common and serious sleep disorder that causes you to stop breathing during sleep  The airway repeatedly becomes blocked, limiting the amount of air that reaches your lungs  When this happens, you may snore loudly or making choking noises as you try to breathe  Your brain and body becomes oxygen deprived and you may wake up  This may happen a few times a night, or in more severe cases, several hundred times a night  Sleep apnea can make you wake up in the morning feeling tired or unrefreshed even though you have had a full night of sleep  During the day, you may feel fatigued, have difficulty concentrating or you may even unintentionally fall asleep  This is because your body is waking up numerous times throughout the night, even though you might not be conscious of each awakening  The lack of oxygen your body receives can have negative long-term consequences for your health  This includes:  High blood pressure  Heart disease  Irregular heart rhythms  Stroke  Pre-diabetes and diabetes  Depression    Testing  An objective evaluation of your sleep may be needed before your board certified sleep physician can make a diagnosis  Options include:   In-lab overnight sleep study  This type of sleep study requires you to stay overnight at a sleep center, in a bed that may resemble a hotel room  You will sleep with sensors hooked up to various parts of your body  These sensors record your brain waves, heartbeat, breathing and movement  An overnight sleep study also provides your doctor with the most complete information about your sleep  Learn more about an overnight sleep study      Home sleep apnea test  Some patients with high risk factors for obstructive sleep apnea and no other medical disorders may be candidates for a home sleep apnea test  The testing equipment differs in that it is less complicated than what is used in an overnight sleep study   As such, does not give all the data an in-lab will and if negative, may not mean you do not have the problem  Treatment for sleep apnea  includes using a continuous positive airway pressure (CPAP) machine to keep your airway open during sleep  A mask is placed over your nose and mouth, or just your nose  The mask is hooked to the CPAP machine that blows a gentle stream of air into the mask when you breathe  This helps keep your airway open so you can breathe more regularly  Extra oxygen may be given to you through the machine  You may be given a mouth device  It looks like a mouth guard or dental retainer and stops your tongue and mouth tissues from blocking your throat while you sleep  Surgery may be needed to remove extra tissues that block your mouth, throat, or nose  Manage sleep apnea:   Do not smoke  Nicotine and other chemicals in cigarettes and cigars can cause lung damage  Ask your healthcare provider for information if you currently smoke and need help to quit  E-cigarettes or smokeless tobacco still contain nicotine  Talk to your healthcare provider before you use these products  Do not drink alcohol or take sedative medicine before you go to sleep  Alcohol and sedatives can relax the muscles and tissues around your throat  This can block the airflow to your lungs  Maintain a healthy weight  Excess tissue around your throat may restrict your breathing  Ask your healthcare provider for information if you need to lose weight  Sleep on your side or use pillows designed to prevent sleep apnea  This prevents your tongue or other tissues from blocking your throat  You can also raise the head of your bed  Driving Safety  Refrain from driving when drowsy  Follow up with your healthcare provider as directed:  Write down your questions so you remember to ask them during your visits  Go to AASM website for more information: Sleepeducation  org     What is MARY?    Obstructive sleep apnea is a common and serious sleep disorder that causes you to stop breathing during sleep  The airway repeatedly becomes blocked, limiting the amount of air that reaches your lungs  When this happens, you may snore loudly or making choking noises as you try to breathe  Your brain and body becomes oxygen deprived and you may wake up  This may happen a few times a night, or in more severe cases, several hundred times a night  Sleep apnea can make you wake up in the morning feeling tired or unrefreshed even though you have had a full night of sleep  During the day, you may feel fatigued, have difficulty concentrating or you may even unintentionally fall asleep  This is because your body is waking up numerous times throughout the night, even though you might not be conscious of each awakening  The lack of oxygen your body receives can have negative long-term consequences for your health  This includes:  High blood pressure  Heart disease  Irregular heart rhythms  Stroke  Pre-diabetes and diabetes  Depression    Testing  An objective evaluation of your sleep may be needed before your board certified sleep physician can make a diagnosis  Options include:   In-lab overnight sleep study  This type of sleep study requires you to stay overnight at a sleep center, in a bed that may resemble a hotel room  You will sleep with sensors hooked up to various parts of your body  These sensors record your brain waves, heartbeat, breathing and movement  An overnight sleep study also provides your doctor with the most complete information about your sleep  Learn more about an overnight sleep study      Home sleep apnea test  Some patients with high risk factors for obstructive sleep apnea and no other medical disorders may be candidates for a home sleep apnea test  The testing equipment differs in that it is less complicated than what is used in an overnight sleep study   As such, does not give all the data an in-lab will and if negative, may not mean you do not have the problem  Treatment for sleep apnea  includes using a continuous positive airway pressure (CPAP) machine to keep your airway open during sleep  A mask is placed over your nose and mouth, or just your nose  The mask is hooked to the CPAP machine that blows a gentle stream of air into the mask when you breathe  This helps keep your airway open so you can breathe more regularly  Extra oxygen may be given to you through the machine  You may be given a mouth device  It looks like a mouth guard or dental retainer and stops your tongue and mouth tissues from blocking your throat while you sleep  Surgery may be needed to remove extra tissues that block your mouth, throat, or nose  Manage sleep apnea:   Do not smoke  Nicotine and other chemicals in cigarettes and cigars can cause lung damage  Ask your healthcare provider for information if you currently smoke and need help to quit  E-cigarettes or smokeless tobacco still contain nicotine  Talk to your healthcare provider before you use these products  Do not drink alcohol or take sedative medicine before you go to sleep  Alcohol and sedatives can relax the muscles and tissues around your throat  This can block the airflow to your lungs  Maintain a healthy weight  Excess tissue around your throat may restrict your breathing  Ask your healthcare provider for information if you need to lose weight  Sleep on your side or use pillows designed to prevent sleep apnea  This prevents your tongue or other tissues from blocking your throat  You can also raise the head of your bed  Driving Safety  Refrain from driving when drowsy  Follow up with your healthcare provider as directed:  Write down your questions so you remember to ask them during your visits  Go to AAS website for more information: Sleepeducation  org

## 2019-02-05 ENCOUNTER — TELEPHONE (OUTPATIENT)
Dept: SLEEP CENTER | Facility: CLINIC | Age: 39
End: 2019-02-05

## 2019-07-22 ENCOUNTER — APPOINTMENT (OUTPATIENT)
Dept: LAB | Facility: CLINIC | Age: 39
End: 2019-07-22
Payer: COMMERCIAL

## 2019-07-22 ENCOUNTER — OFFICE VISIT (OUTPATIENT)
Dept: FAMILY MEDICINE CLINIC | Facility: CLINIC | Age: 39
End: 2019-07-22
Payer: COMMERCIAL

## 2019-07-22 VITALS
WEIGHT: 216.6 LBS | HEIGHT: 67 IN | TEMPERATURE: 97 F | DIASTOLIC BLOOD PRESSURE: 80 MMHG | HEART RATE: 57 BPM | BODY MASS INDEX: 34 KG/M2 | OXYGEN SATURATION: 97 % | SYSTOLIC BLOOD PRESSURE: 134 MMHG

## 2019-07-22 DIAGNOSIS — L30.9 DERMATITIS: ICD-10-CM

## 2019-07-22 DIAGNOSIS — B35.9 TINEA: ICD-10-CM

## 2019-07-22 DIAGNOSIS — Z83.3 FAMILY HISTORY OF DIABETES MELLITUS: ICD-10-CM

## 2019-07-22 DIAGNOSIS — L30.9 DERMATITIS: Primary | ICD-10-CM

## 2019-07-22 LAB
ALBUMIN SERPL BCP-MCNC: 4 G/DL (ref 3.5–5)
ALP SERPL-CCNC: 92 U/L (ref 46–116)
ALT SERPL W P-5'-P-CCNC: 43 U/L (ref 12–78)
ANION GAP SERPL CALCULATED.3IONS-SCNC: 4 MMOL/L (ref 4–13)
AST SERPL W P-5'-P-CCNC: 23 U/L (ref 5–45)
BILIRUB SERPL-MCNC: 0.38 MG/DL (ref 0.2–1)
BUN SERPL-MCNC: 13 MG/DL (ref 5–25)
CALCIUM SERPL-MCNC: 9 MG/DL (ref 8.3–10.1)
CHLORIDE SERPL-SCNC: 102 MMOL/L (ref 100–108)
CO2 SERPL-SCNC: 30 MMOL/L (ref 21–32)
CREAT SERPL-MCNC: 0.94 MG/DL (ref 0.6–1.3)
EST. AVERAGE GLUCOSE BLD GHB EST-MCNC: 111 MG/DL
GFR SERPL CREATININE-BSD FRML MDRD: 101 ML/MIN/1.73SQ M
GLUCOSE SERPL-MCNC: 93 MG/DL (ref 65–140)
HBA1C MFR BLD: 5.5 % (ref 4.2–6.3)
POTASSIUM SERPL-SCNC: 3.5 MMOL/L (ref 3.5–5.3)
PROT SERPL-MCNC: 8.2 G/DL (ref 6.4–8.2)
SODIUM SERPL-SCNC: 136 MMOL/L (ref 136–145)

## 2019-07-22 PROCEDURE — 3008F BODY MASS INDEX DOCD: CPT | Performed by: FAMILY MEDICINE

## 2019-07-22 PROCEDURE — 80053 COMPREHEN METABOLIC PANEL: CPT

## 2019-07-22 PROCEDURE — 36415 COLL VENOUS BLD VENIPUNCTURE: CPT

## 2019-07-22 PROCEDURE — 83036 HEMOGLOBIN GLYCOSYLATED A1C: CPT

## 2019-07-22 PROCEDURE — 99213 OFFICE O/P EST LOW 20 MIN: CPT | Performed by: FAMILY MEDICINE

## 2019-07-22 RX ORDER — CLOTRIMAZOLE AND BETAMETHASONE DIPROPIONATE 10; .64 MG/G; MG/G
CREAM TOPICAL 2 TIMES DAILY
Qty: 45 G | Refills: 0 | Status: SHIPPED | OUTPATIENT
Start: 2019-07-22 | End: 2020-09-14 | Stop reason: SDUPTHER

## 2019-07-22 NOTE — PROGRESS NOTES
Assessment/Plan:  Patient is a 76-year-old male with a rash on his right knee which is demarcated  It is reddish and scaly  He also has a small patch on his left outer lower extremity and on his feet  The rash appears to be tinea  I have prescribed a combination of an antifungal and a cortisone cream   He is to call if there is not improvement  I may consider calling in an oral form of antifungal     He is concerned about his blood sugar and so I did give him an order for a chemistry and hemoglobin A1c  Diagnoses and all orders for this visit:    Dermatitis  -     Comprehensive metabolic panel; Future  -     HEMOGLOBIN A1C W/ EAG ESTIMATION; Future  -     clotrimazole-betamethasone (LOTRISONE) 1-0 05 % cream; Apply topically 2 (two) times a day    Tinea    Family history of diabetes mellitus          Subjective:   Chief Complaint   Patient presents with    Rash     Rash on right knee going on for about 3 months now getting biger  Patient ID: Deanne Ruano is a 35 y o  male  Patient has a rash on his knee for the past three months  He applied an anti- fungal for about two weeks  The rash is itchy  It is on his right knee but also some patch on his left leg and both feet  The following portions of the patient's history were reviewed and updated as appropriate: allergies, current medications, past family history, past medical history, past social history, past surgical history and problem list     Review of Systems   Constitutional: Negative for chills and fever  Skin: Positive for rash  Objective:      /80 (BP Location: Left arm, Patient Position: Sitting, Cuff Size: Adult)   Pulse 57   Temp (!) 97 °F (36 1 °C) (Tympanic)   Ht 5' 7" (1 702 m)   Wt 98 2 kg (216 lb 9 6 oz)   SpO2 97%   BMI 33 92 kg/m²          Physical Exam   Constitutional: He is oriented to person, place, and time  He appears well-developed  No distress     Neurological: He is alert and oriented to person, place, and time  Skin: Rash noted  Patient with a clearly demarcated rash on the outer surface of his right knee  It is scaly and erythematous  He also has a round scaly patch on his left outer leg and also a scaly rash on his feet  Psychiatric: He has a normal mood and affect  Nursing note and vitals reviewed

## 2019-11-06 ENCOUNTER — OFFICE VISIT (OUTPATIENT)
Dept: FAMILY MEDICINE CLINIC | Facility: CLINIC | Age: 39
End: 2019-11-06
Payer: COMMERCIAL

## 2019-11-06 VITALS
RESPIRATION RATE: 17 BRPM | BODY MASS INDEX: 33.56 KG/M2 | HEART RATE: 75 BPM | HEIGHT: 68 IN | SYSTOLIC BLOOD PRESSURE: 118 MMHG | TEMPERATURE: 97.1 F | OXYGEN SATURATION: 98 % | WEIGHT: 221.4 LBS | DIASTOLIC BLOOD PRESSURE: 86 MMHG

## 2019-11-06 DIAGNOSIS — Z23 NEED FOR VACCINATION: Primary | ICD-10-CM

## 2019-11-06 DIAGNOSIS — K21.9 GASTROESOPHAGEAL REFLUX DISEASE WITHOUT ESOPHAGITIS: ICD-10-CM

## 2019-11-06 PROCEDURE — 99214 OFFICE O/P EST MOD 30 MIN: CPT | Performed by: FAMILY MEDICINE

## 2019-11-06 PROCEDURE — 90471 IMMUNIZATION ADMIN: CPT | Performed by: FAMILY MEDICINE

## 2019-11-06 PROCEDURE — 3008F BODY MASS INDEX DOCD: CPT | Performed by: FAMILY MEDICINE

## 2019-11-06 PROCEDURE — 90686 IIV4 VACC NO PRSV 0.5 ML IM: CPT | Performed by: FAMILY MEDICINE

## 2019-11-06 RX ORDER — OMEPRAZOLE 20 MG/1
20 CAPSULE, DELAYED RELEASE ORAL
Qty: 30 CAPSULE | Refills: 2 | Status: SHIPPED | OUTPATIENT
Start: 2019-11-06 | End: 2020-02-27 | Stop reason: SDUPTHER

## 2019-11-06 NOTE — PROGRESS NOTES
Assessment/Plan:   1  Gastroesophageal reflux disease without esophagitis  Reviewed patient's symptoms today  Reviewed the pathophysiology is problem as well as different treatment options  He was advised on importance of dietary trigger avoidance  He was also instructed on importance of maintaining a very bland diet for the next few days  He may slowly advance his diet as tolerated  Will start treatment empirically with omeprazole 20 mg 30 minutes before breakfast every day  He was advised to take this medication for the next 3-4 weeks  He may discontinue this medication at that time if his symptoms are well improved  Follow up if any symptoms are worsening   - omeprazole (PriLOSEC) 20 mg delayed release capsule; Take 1 capsule (20 mg total) by mouth daily before breakfast  Dispense: 30 capsule; Refill: 2    2  Need for vaccination  - influenza vaccine, [id number], quadrivalent, 0 5 mL, preservative-free, for adult and pediatric patients 6 mos+ (AFLURIA, FLUARIX, FLULAVAL, FLUZONE)    BMI Counseling: Body mass index is 34 16 kg/m²  The BMI is above normal  Nutrition recommendations include decreasing portion sizes, encouraging healthy choices of fruits and vegetables and decreasing fast food intake  Exercise recommendations include moderate physical activity 150 minutes/week  No pharmacotherapy was ordered  Diagnoses and all orders for this visit:    Need for vaccination  -     influenza vaccine, [id number], quadrivalent, 0 5 mL, preservative-free, for adult and pediatric patients 6 mos+ (AFLURIA, FLUARIX, FLULAVAL, FLUZONE)          Subjective:       Chief Complaint   Patient presents with    Heartburn      Patient ID: Ange Galvan is a 35 y o  male  Heartburn   He complains of abdominal pain, belching, coughing and heartburn  He reports no chest pain, no early satiety, no nausea or no sore throat  This is a recurrent problem  Episode onset: 2 months ago  The problem occurs frequently   The problem has been unchanged  The symptoms are aggravated by certain foods and lying down  Pertinent negatives include no fatigue, melena or muscle weakness  He has tried an antacid for the symptoms  The treatment provided mild relief  Review of Systems   Constitutional: Negative for activity change, chills, fatigue and fever  HENT: Negative for congestion, ear pain, sinus pressure and sore throat  Eyes: Negative for redness, itching and visual disturbance  Respiratory: Positive for cough  Negative for shortness of breath  Cardiovascular: Negative for chest pain and palpitations  Gastrointestinal: Positive for abdominal pain and heartburn  Negative for diarrhea, melena and nausea  Endocrine: Negative for cold intolerance and heat intolerance  Genitourinary: Negative for dysuria, flank pain and frequency  Musculoskeletal: Negative for arthralgias, back pain, gait problem, myalgias and muscle weakness  Skin: Negative for color change  Allergic/Immunologic: Negative for environmental allergies  Neurological: Negative for dizziness, numbness and headaches  Psychiatric/Behavioral: Negative for behavioral problems and sleep disturbance  The following portions of the patient's history were reviewed and updated as appropriate : past family history, past medical history, past social history and past surgical history      Current Outpatient Medications:     clotrimazole-betamethasone (LOTRISONE) 1-0 05 % cream, Apply topically 2 (two) times a day, Disp: 45 g, Rfl: 0    sildenafil (VIAGRA) 100 mg tablet, Take 1 tablet (100 mg total) by mouth as needed for erectile dysfunction, Disp: 8 tablet, Rfl: 5         Objective:         Vitals:    11/06/19 1116   BP: 118/86   BP Location: Left arm   Patient Position: Sitting   Pulse: 75   Resp: 17   Temp: (!) 97 1 °F (36 2 °C)   TempSrc: Tympanic   SpO2: 98%   Weight: 100 kg (221 lb 6 4 oz)   Height: 5' 7 5" (1 715 m)     Physical Exam Constitutional: He is oriented to person, place, and time  He appears well-developed and well-nourished  HENT:   Head: Normocephalic and atraumatic  Nose: Nose normal    Mouth/Throat: No oropharyngeal exudate  Eyes: Pupils are equal, round, and reactive to light  Right eye exhibits no discharge  Left eye exhibits no discharge  Neck: Normal range of motion  Neck supple  No tracheal deviation present  Cardiovascular: Normal rate, regular rhythm and intact distal pulses  Exam reveals no gallop and no friction rub  No murmur heard  Pulses:       Dorsalis pedis pulses are 2+ on the right side, and 2+ on the left side  Posterior tibial pulses are 2+ on the right side, and 2+ on the left side  Pulmonary/Chest: Effort normal and breath sounds normal  No respiratory distress  He has no wheezes  He has no rales  Abdominal: Soft  Bowel sounds are normal  He exhibits no distension  There is no tenderness  There is no rebound and no guarding  Musculoskeletal: Normal range of motion  He exhibits no edema  Lymphadenopathy:        Head (right side): No submental and no submandibular adenopathy present  Head (left side): No submental and no submandibular adenopathy present  He has no cervical adenopathy  Right cervical: No superficial cervical, no deep cervical and no posterior cervical adenopathy present  Left cervical: No superficial cervical, no deep cervical and no posterior cervical adenopathy present  Neurological: He is alert and oriented to person, place, and time  No cranial nerve deficit or sensory deficit  Skin: Skin is warm, dry and intact  Psychiatric: His speech is normal and behavior is normal  Judgment normal  His mood appears not anxious  Cognition and memory are normal  He does not exhibit a depressed mood  Vitals reviewed

## 2020-02-27 ENCOUNTER — OFFICE VISIT (OUTPATIENT)
Dept: FAMILY MEDICINE CLINIC | Facility: CLINIC | Age: 40
End: 2020-02-27
Payer: COMMERCIAL

## 2020-02-27 VITALS
WEIGHT: 224.6 LBS | SYSTOLIC BLOOD PRESSURE: 120 MMHG | OXYGEN SATURATION: 99 % | TEMPERATURE: 97.5 F | BODY MASS INDEX: 34.04 KG/M2 | HEIGHT: 68 IN | DIASTOLIC BLOOD PRESSURE: 90 MMHG | HEART RATE: 72 BPM

## 2020-02-27 DIAGNOSIS — Z13.29 SCREENING FOR THYROID DISORDER: ICD-10-CM

## 2020-02-27 DIAGNOSIS — Z13.6 SCREENING FOR CARDIOVASCULAR CONDITION: ICD-10-CM

## 2020-02-27 DIAGNOSIS — Z13.220 SCREENING FOR LIPID DISORDERS: ICD-10-CM

## 2020-02-27 DIAGNOSIS — Z13.1 SCREENING FOR DIABETES MELLITUS: ICD-10-CM

## 2020-02-27 DIAGNOSIS — K21.9 GASTROESOPHAGEAL REFLUX DISEASE WITHOUT ESOPHAGITIS: Primary | ICD-10-CM

## 2020-02-27 PROCEDURE — 99213 OFFICE O/P EST LOW 20 MIN: CPT | Performed by: NURSE PRACTITIONER

## 2020-02-27 PROCEDURE — 3008F BODY MASS INDEX DOCD: CPT | Performed by: NURSE PRACTITIONER

## 2020-02-27 PROCEDURE — 1036F TOBACCO NON-USER: CPT | Performed by: NURSE PRACTITIONER

## 2020-02-27 RX ORDER — OMEPRAZOLE 20 MG/1
20 CAPSULE, DELAYED RELEASE ORAL
Qty: 30 CAPSULE | Refills: 2 | Status: SHIPPED | OUTPATIENT
Start: 2020-02-27 | End: 2020-03-22

## 2020-02-27 NOTE — LETTER
February 27, 2020     Patient: Sri Rosenberg   YOB: 1980   Date of Visit: 2/27/2020       To Whom it May Concern:    Romero Astorga is under my professional care  He was seen in my office on 2/27/2020  Please excuse from work today  If you have any questions or concerns, please don't hesitate to call           Sincerely,          CLINT Malone        CC: No Recipients

## 2020-02-27 NOTE — PROGRESS NOTES
[facility]    ASSESSMENT AND PLAN         1  Gastroesophageal reflux disease without esophagitis  S/S consistent of likely GERD flare  Differential consider:  Mild Gastroenteritis  Physical assessment today unremarkable  Monitor for return in/changing symptoms  May take evening omeprazole occasionally, if needed  And or Tums  Stay well hydrated  Note given for work today  Re-evaluate if needed  - omeprazole (PriLOSEC) 20 mg delayed release capsule; Take 1 capsule (20 mg total) by mouth daily before breakfast  Dispense: 30 capsule; Refill: 2    3  Screening for thyroid disorder  Overdue for an annual physical and routine screening lab work  Fasting labs ordered today  Patient to obtain and then return in the next 1-2 weeks for physical     - TSH, 3rd generation with Free T4 reflex; Future  - TSH, 3rd generation with Free T4 reflex    4  Screening for lipid disorders    - Lipid panel; Future  - Lipid panel    5  Screening for cardiovascular condition    - CBC and differential; Future  - CBC and differential    6  Screening for diabetes mellitus    - Comprehensive metabolic panel; Future  - Comprehensive metabolic panel            SUBJECTIVE       Patient ID: Sonali Gentile is a 38 y o  male  Chief Complaint   Patient presents with    Abdominal Pain     Since last night, no nausea, vomiting or diarrhea       HISTORY OF PRESENT ILLNESS    Patient presents today for an acute visit  Complains of epigastric pain, extending down to the middle of his stomach  Symptoms started around 2:00 a m   Intermittent  Ten aching pain  Denies any stabbing or radiating  States he and his wife ate at [employer] last night  He had spicy macro knee and she and a steak  States that the steak was more rare than he is used to  Nothing tasted bad, but he thought perhaps his symptoms were either from the spicy mac and cheese or if he got "a bug from the steak  Denies any nausea or vomiting    Ate a normal breakfast this morning  Denies any diarrhea, but states he has not had a bowel movement yet today  He normally has a bowel movement every day  He did stay home from work today, needs note        The following portions of the patient's history were reviewed and updated as appropriate: allergies, current medications, past family history, past medical history, past social history, past surgical history and problem list     REVIEW OF SYSTEMS  Review of Systems   Constitutional: Negative  Respiratory: Negative  Cardiovascular: Negative  Gastrointestinal: Negative for abdominal distention, constipation, diarrhea, nausea and vomiting  Abdominal pain: Mild, epigastric  Psychiatric/Behavioral: Negative  OBJECTIVE      VITAL SIGNS  /90 (BP Location: Left arm, Patient Position: Sitting, Cuff Size: Adult)   Pulse 72   Temp 97 5 °F (36 4 °C)   Ht 5' 7 5" (1 715 m)   Wt 102 kg (224 lb 9 6 oz)   SpO2 99%   BMI 34 66 kg/m²     CURRENT MEDICATIONS    Current Outpatient Medications:     clotrimazole-betamethasone (LOTRISONE) 1-0 05 % cream, Apply topically 2 (two) times a day, Disp: 45 g, Rfl: 0    omeprazole (PriLOSEC) 20 mg delayed release capsule, Take 1 capsule (20 mg total) by mouth daily before breakfast, Disp: 30 capsule, Rfl: 2    sildenafil (VIAGRA) 100 mg tablet, Take 1 tablet (100 mg total) by mouth as needed for erectile dysfunction, Disp: 8 tablet, Rfl: 5      PHYSICAL EXAMINATION   Physical Exam   Constitutional: He is oriented to person, place, and time  Vital signs are normal  He appears well-developed and well-nourished  Cardiovascular: Normal rate and regular rhythm  Pulmonary/Chest: Effort normal and breath sounds normal  No respiratory distress  Abdominal: Soft  Normal appearance and bowel sounds are normal  He exhibits no distension  There is no tenderness  There is no rigidity, no rebound, no guarding and no CVA tenderness     Neurological: He is alert and oriented to person, place, and time  Psychiatric: He has a normal mood and affect  Thought content normal    Nursing note and vitals reviewed

## 2020-03-21 DIAGNOSIS — K21.9 GASTROESOPHAGEAL REFLUX DISEASE WITHOUT ESOPHAGITIS: ICD-10-CM

## 2020-03-22 RX ORDER — OMEPRAZOLE 20 MG/1
20 CAPSULE, DELAYED RELEASE ORAL
Qty: 30 CAPSULE | Refills: 2 | Status: SHIPPED | OUTPATIENT
Start: 2020-03-22 | End: 2020-09-03 | Stop reason: SDUPTHER

## 2020-09-01 ENCOUNTER — TELEPHONE (OUTPATIENT)
Dept: FAMILY MEDICINE CLINIC | Facility: CLINIC | Age: 40
End: 2020-09-01

## 2020-09-03 DIAGNOSIS — K21.9 GASTROESOPHAGEAL REFLUX DISEASE WITHOUT ESOPHAGITIS: ICD-10-CM

## 2020-09-03 RX ORDER — OMEPRAZOLE 20 MG/1
20 CAPSULE, DELAYED RELEASE ORAL
Qty: 30 CAPSULE | Refills: 0 | Status: SHIPPED | OUTPATIENT
Start: 2020-09-03 | End: 2020-11-13 | Stop reason: SDUPTHER

## 2020-09-03 NOTE — TELEPHONE ENCOUNTER
Please call patient  He is overdue for six-month checkup  I did renew his medication for 30 days  Please remind him he has a standing pending lab work to complete as well    Please ask him to do so before his visit

## 2020-09-14 ENCOUNTER — OFFICE VISIT (OUTPATIENT)
Dept: FAMILY MEDICINE CLINIC | Facility: CLINIC | Age: 40
End: 2020-09-14
Payer: COMMERCIAL

## 2020-09-14 VITALS
TEMPERATURE: 98 F | SYSTOLIC BLOOD PRESSURE: 140 MMHG | HEIGHT: 68 IN | HEART RATE: 70 BPM | WEIGHT: 224.2 LBS | BODY MASS INDEX: 33.98 KG/M2 | DIASTOLIC BLOOD PRESSURE: 100 MMHG | OXYGEN SATURATION: 97 %

## 2020-09-14 DIAGNOSIS — Z13.1 SCREENING FOR DIABETES MELLITUS: ICD-10-CM

## 2020-09-14 DIAGNOSIS — B35.4 TINEA CORPORIS: Primary | ICD-10-CM

## 2020-09-14 DIAGNOSIS — R03.0 ELEVATED BLOOD PRESSURE READING: ICD-10-CM

## 2020-09-14 DIAGNOSIS — Z13.6 SCREENING FOR CARDIOVASCULAR CONDITION: ICD-10-CM

## 2020-09-14 DIAGNOSIS — Z13.220 SCREENING FOR LIPID DISORDERS: ICD-10-CM

## 2020-09-14 DIAGNOSIS — Z23 NEED FOR INFLUENZA VACCINATION: ICD-10-CM

## 2020-09-14 DIAGNOSIS — Z13.29 SCREENING FOR THYROID DISORDER: ICD-10-CM

## 2020-09-14 PROCEDURE — 99214 OFFICE O/P EST MOD 30 MIN: CPT | Performed by: NURSE PRACTITIONER

## 2020-09-14 PROCEDURE — 90471 IMMUNIZATION ADMIN: CPT | Performed by: NURSE PRACTITIONER

## 2020-09-14 PROCEDURE — 90686 IIV4 VACC NO PRSV 0.5 ML IM: CPT | Performed by: NURSE PRACTITIONER

## 2020-09-14 RX ORDER — CLOTRIMAZOLE AND BETAMETHASONE DIPROPIONATE 10; .64 MG/G; MG/G
CREAM TOPICAL 2 TIMES DAILY
Qty: 45 G | Refills: 0 | Status: SHIPPED | OUTPATIENT
Start: 2020-09-14 | End: 2021-08-10 | Stop reason: SDUPTHER

## 2020-09-14 RX ORDER — CLOTRIMAZOLE AND BETAMETHASONE DIPROPIONATE 10; .64 MG/G; MG/G
CREAM TOPICAL 2 TIMES DAILY
Qty: 45 G | Refills: 0 | Status: SHIPPED | OUTPATIENT
Start: 2020-09-14 | End: 2020-09-14 | Stop reason: SDUPTHER

## 2020-09-14 NOTE — LETTER
September 15, 2020     Patient: Dimple Castillo   YOB: 1980   Date of Visit: 9/14/2020       To Whom it May Concern:    Sabrinabibiana Jeffers is under my professional care  He was seen in my office on 9/14/2020  He will be going for lab work 9/15/2020 in the morning  Please excuse if he is late for work  If you have any questions or concerns, please don't hesitate to call           Sincerely,          CLINT Banks        CC: No Recipients

## 2020-09-14 NOTE — LETTER
September 14, 2020     Patient: Joselyn Head   YOB: 1980   Date of Visit: 9/14/2020       To Whom it May Concern:    Christine Reeves is under my professional care  He was seen in my office on 9/14/2020  If you have any questions or concerns, please don't hesitate to call           Sincerely,          CLINT Ortiz        CC: No Recipients

## 2020-09-15 NOTE — PROGRESS NOTES
[facility]    ASSESSMENT AND PLAN     1  Tinea corporis  S/S consistent with fungal infection  Treated prior with Lotrisone cream   Patient states initially resolves, but then returns  With failure to topical treatment, consider Lamisil x2 weeks  Patient with no recent lab work  Orders placed today  If appropriate, will trial Lamisil p o  Refill for Lotrisone in the interim for symptom management  Note:  Consider podiatry referral for nail fungus  Patient considering     - clotrimazole-betamethasone (LOTRISONE) 1-0 05 % cream; Apply topically 2 (two) times a day  Dispense: 45 g; Refill: 0    2  Elevated blood pressure reading  BP elevated:  140/90 on rooming  Repeat 122/94  Asymptomatic  Trends reviewed  Several elevated reads  Recommend home BP monitoring  Return after lab work obtained for annual physical   BP will be re-evaluated at that time  Bring home machine and readings to that appointment  Return sooner if needed  3  Need for influenza vaccination  Administered today    - influenza vaccine, quadrivalent, 0 5 mL, preservative-free, for adult and pediatric patients 6 mos+ (AFLURIA, FLUARIX, FLULAVAL, FLUZONE)    4  Screening for thyroid disorder    - TSH, 3rd generation with Free T4 reflex; Future  - TSH, 3rd generation with Free T4 reflex    5  Screening for lipid disorders    - Lipid panel; Future  - Lipid panel    6  Screening for cardiovascular condition    - CBC and differential; Future  - CBC and differential    7  Screening for diabetes mellitus    - Comprehensive metabolic panel; Future  - Comprehensive metabolic panel            SUBJECTIVE       Patient ID: Evi Wakefield is a 38 y o  male  Chief Complaint   Patient presents with    Rash     Bilateral lower legs, has been on going for over a year       HISTORY OF PRESENT ILLNESS    Patient presents today with skin concern  States he has had a rash on his bilateral legs on and off for several months, up to 1 year  He has been evaluated in the past and treated with Lotrisone  States the cream is effective, but the rash returns  It is only at his knees and below  States it is crusted, circular patches when it is present  Very itchy  The following portions of the patient's history were reviewed and updated as appropriate: allergies, current medications, past family history, past medical history, past social history, past surgical history and problem list     REVIEW OF SYSTEMS  Review of Systems   Skin: Positive for rash (As described in HPI)  OBJECTIVE      VITAL SIGNS  /100 (BP Location: Left arm, Patient Position: Sitting, Cuff Size: Adult)   Pulse 70   Temp 98 °F (36 7 °C)   Ht 5' 7 5" (1 715 m)   Wt 102 kg (224 lb 3 2 oz)   SpO2 97%   BMI 34 60 kg/m²     CURRENT MEDICATIONS    Current Outpatient Medications:     omeprazole (PriLOSEC) 20 mg delayed release capsule, Take 1 capsule (20 mg total) by mouth daily before breakfast, Disp: 30 capsule, Rfl: 0    clotrimazole-betamethasone (LOTRISONE) 1-0 05 % cream, Apply topically 2 (two) times a day, Disp: 45 g, Rfl: 0      PHYSICAL EXAMINATION   Physical Exam  Vitals signs and nursing note reviewed  Constitutional:       General: He is not in acute distress  Appearance: Normal appearance  He is not ill-appearing  Cardiovascular:      Rate and Rhythm: Normal rate and regular rhythm  Pulmonary:      Effort: Pulmonary effort is normal  No respiratory distress  Breath sounds: Normal breath sounds and air entry  Musculoskeletal:      Right lower leg: No edema  Left lower leg: No edema  Skin:     Findings: Rash present  Rash is crusting and scaling  Comments: Patient presents with crusty, ring shaped rash  Few scattered on lower extremities bilaterally  Mildly red with areas of scaling  Varying shades  Definitive, raised external borders  Note thick, discolored large toe nail, left foot    Flaky   Neurological:      Mental Status: He is alert and oriented to person, place, and time     Psychiatric:         Attention and Perception: Attention and perception normal          Mood and Affect: Mood and affect normal          Speech: Speech normal          Behavior: Behavior normal          Judgment: Judgment normal

## 2020-09-16 LAB
ALBUMIN SERPL-MCNC: 4.4 G/DL (ref 3.6–5.1)
ALBUMIN/GLOB SERPL: 1.5 (CALC) (ref 1–2.5)
ALP SERPL-CCNC: 71 U/L (ref 36–130)
ALT SERPL-CCNC: 31 U/L (ref 9–46)
AST SERPL-CCNC: 22 U/L (ref 10–40)
BASOPHILS # BLD AUTO: 31 CELLS/UL (ref 0–200)
BASOPHILS NFR BLD AUTO: 0.5 %
BILIRUB SERPL-MCNC: 0.4 MG/DL (ref 0.2–1.2)
BUN SERPL-MCNC: 14 MG/DL (ref 7–25)
BUN/CREAT SERPL: ABNORMAL (CALC) (ref 6–22)
CALCIUM SERPL-MCNC: 9.3 MG/DL (ref 8.6–10.3)
CHLORIDE SERPL-SCNC: 104 MMOL/L (ref 98–110)
CHOLEST SERPL-MCNC: 155 MG/DL
CHOLEST/HDLC SERPL: 4.3 (CALC)
CO2 SERPL-SCNC: 31 MMOL/L (ref 20–32)
CREAT SERPL-MCNC: 0.98 MG/DL (ref 0.6–1.35)
EOSINOPHIL # BLD AUTO: 616 CELLS/UL (ref 15–500)
EOSINOPHIL NFR BLD AUTO: 10.1 %
ERYTHROCYTE [DISTWIDTH] IN BLOOD BY AUTOMATED COUNT: 13.2 % (ref 11–15)
GLOBULIN SER CALC-MCNC: 3 G/DL (CALC) (ref 1.9–3.7)
GLUCOSE SERPL-MCNC: 106 MG/DL (ref 65–99)
HCT VFR BLD AUTO: 44.4 % (ref 38.5–50)
HDLC SERPL-MCNC: 36 MG/DL
HGB BLD-MCNC: 14.5 G/DL (ref 13.2–17.1)
LDLC SERPL CALC-MCNC: 97 MG/DL (CALC)
LYMPHOCYTES # BLD AUTO: 1507 CELLS/UL (ref 850–3900)
LYMPHOCYTES NFR BLD AUTO: 24.7 %
MCH RBC QN AUTO: 28.7 PG (ref 27–33)
MCHC RBC AUTO-ENTMCNC: 32.7 G/DL (ref 32–36)
MCV RBC AUTO: 87.7 FL (ref 80–100)
MONOCYTES # BLD AUTO: 390 CELLS/UL (ref 200–950)
MONOCYTES NFR BLD AUTO: 6.4 %
NEUTROPHILS # BLD AUTO: 3556 CELLS/UL (ref 1500–7800)
NEUTROPHILS NFR BLD AUTO: 58.3 %
NONHDLC SERPL-MCNC: 119 MG/DL (CALC)
PLATELET # BLD AUTO: 220 THOUSAND/UL (ref 140–400)
PMV BLD REES-ECKER: 10.7 FL (ref 7.5–12.5)
POTASSIUM SERPL-SCNC: 4 MMOL/L (ref 3.5–5.3)
PROT SERPL-MCNC: 7.4 G/DL (ref 6.1–8.1)
RBC # BLD AUTO: 5.06 MILLION/UL (ref 4.2–5.8)
SL AMB EGFR AFRICAN AMERICAN: 111 ML/MIN/1.73M2
SL AMB EGFR NON AFRICAN AMERICAN: 95 ML/MIN/1.73M2
SODIUM SERPL-SCNC: 140 MMOL/L (ref 135–146)
TRIGL SERPL-MCNC: 123 MG/DL
TSH SERPL-ACNC: 2.56 MIU/L (ref 0.4–4.5)
WBC # BLD AUTO: 6.1 THOUSAND/UL (ref 3.8–10.8)

## 2020-09-22 ENCOUNTER — TELEPHONE (OUTPATIENT)
Dept: FAMILY MEDICINE CLINIC | Facility: CLINIC | Age: 40
End: 2020-09-22

## 2020-09-22 NOTE — TELEPHONE ENCOUNTER
Pt wife came in would like to know have you looked at his blood results to determine does he need a higher dose in a medication you put him on a cream and said he would probably then need a pill

## 2020-09-28 DIAGNOSIS — B35.1 NAIL FUNGUS: Primary | ICD-10-CM

## 2020-09-28 NOTE — TELEPHONE ENCOUNTER
Patient's wife called again today (spoke to [place])  Please review previous message and advise if patient needs additional medication

## 2020-09-28 NOTE — TELEPHONE ENCOUNTER
Please call patient  I would like him evaluated by Podiatry to determine if po Lamisil is appropriate   Referral placed for Dr Rui Selby

## 2020-10-06 ENCOUNTER — OFFICE VISIT (OUTPATIENT)
Dept: FAMILY MEDICINE CLINIC | Facility: CLINIC | Age: 40
End: 2020-10-06
Payer: COMMERCIAL

## 2020-10-06 VITALS
WEIGHT: 221.8 LBS | OXYGEN SATURATION: 98 % | DIASTOLIC BLOOD PRESSURE: 90 MMHG | BODY MASS INDEX: 34.81 KG/M2 | TEMPERATURE: 97.8 F | HEART RATE: 70 BPM | SYSTOLIC BLOOD PRESSURE: 130 MMHG | HEIGHT: 67 IN

## 2020-10-06 DIAGNOSIS — R03.0 ELEVATED BP WITHOUT DIAGNOSIS OF HYPERTENSION: ICD-10-CM

## 2020-10-06 DIAGNOSIS — R73.01 ABNORMAL FASTING GLUCOSE: ICD-10-CM

## 2020-10-06 DIAGNOSIS — Z00.00 ANNUAL PHYSICAL EXAM: Primary | ICD-10-CM

## 2020-10-06 DIAGNOSIS — E66.9 OBESITY (BMI 35.0-39.9 WITHOUT COMORBIDITY): ICD-10-CM

## 2020-10-06 LAB — SL AMB POCT HEMOGLOBIN AIC: 5.3 (ref ?–6.5)

## 2020-10-06 PROCEDURE — 83036 HEMOGLOBIN GLYCOSYLATED A1C: CPT | Performed by: NURSE PRACTITIONER

## 2020-10-06 PROCEDURE — 99396 PREV VISIT EST AGE 40-64: CPT | Performed by: NURSE PRACTITIONER

## 2020-10-06 PROCEDURE — 1036F TOBACCO NON-USER: CPT | Performed by: NURSE PRACTITIONER

## 2020-11-05 ENCOUNTER — CLINICAL SUPPORT (OUTPATIENT)
Dept: FAMILY MEDICINE CLINIC | Facility: CLINIC | Age: 40
End: 2020-11-05

## 2020-11-05 DIAGNOSIS — K21.9 GASTROESOPHAGEAL REFLUX DISEASE WITHOUT ESOPHAGITIS: Primary | ICD-10-CM

## 2020-11-13 DIAGNOSIS — K21.9 GASTROESOPHAGEAL REFLUX DISEASE WITHOUT ESOPHAGITIS: ICD-10-CM

## 2020-11-13 RX ORDER — OMEPRAZOLE 20 MG/1
20 CAPSULE, DELAYED RELEASE ORAL
Qty: 30 CAPSULE | Refills: 0 | Status: SHIPPED | OUTPATIENT
Start: 2020-11-13 | End: 2020-12-16 | Stop reason: SDUPTHER

## 2020-12-04 ENCOUNTER — TELEMEDICINE (OUTPATIENT)
Dept: FAMILY MEDICINE CLINIC | Facility: CLINIC | Age: 40
End: 2020-12-04
Payer: COMMERCIAL

## 2020-12-04 DIAGNOSIS — Z20.822 EXPOSURE TO COVID-19 VIRUS: Primary | ICD-10-CM

## 2020-12-04 DIAGNOSIS — Z20.822 EXPOSURE TO COVID-19 VIRUS: ICD-10-CM

## 2020-12-04 PROCEDURE — G2012 BRIEF CHECK IN BY MD/QHP: HCPCS | Performed by: NURSE PRACTITIONER

## 2020-12-04 PROCEDURE — U0003 INFECTIOUS AGENT DETECTION BY NUCLEIC ACID (DNA OR RNA); SEVERE ACUTE RESPIRATORY SYNDROME CORONAVIRUS 2 (SARS-COV-2) (CORONAVIRUS DISEASE [COVID-19]), AMPLIFIED PROBE TECHNIQUE, MAKING USE OF HIGH THROUGHPUT TECHNOLOGIES AS DESCRIBED BY CMS-2020-01-R: HCPCS | Performed by: NURSE PRACTITIONER

## 2020-12-05 LAB — SARS-COV-2 RNA SPEC QL NAA+PROBE: DETECTED

## 2020-12-09 ENCOUNTER — TELEMEDICINE (OUTPATIENT)
Dept: FAMILY MEDICINE CLINIC | Facility: CLINIC | Age: 40
End: 2020-12-09
Payer: COMMERCIAL

## 2020-12-09 DIAGNOSIS — U07.1 COVID-19: Primary | ICD-10-CM

## 2020-12-09 PROCEDURE — 99213 OFFICE O/P EST LOW 20 MIN: CPT | Performed by: NURSE PRACTITIONER

## 2020-12-09 PROCEDURE — 1036F TOBACCO NON-USER: CPT | Performed by: NURSE PRACTITIONER

## 2020-12-16 DIAGNOSIS — K21.9 GASTROESOPHAGEAL REFLUX DISEASE WITHOUT ESOPHAGITIS: ICD-10-CM

## 2020-12-16 RX ORDER — OMEPRAZOLE 20 MG/1
20 CAPSULE, DELAYED RELEASE ORAL
Qty: 30 CAPSULE | Refills: 3 | Status: SHIPPED | OUTPATIENT
Start: 2020-12-16 | End: 2021-05-04 | Stop reason: SDUPTHER

## 2021-04-27 ENCOUNTER — TRANSCRIBE ORDERS (OUTPATIENT)
Dept: SLEEP CENTER | Facility: CLINIC | Age: 41
End: 2021-04-27

## 2021-04-27 DIAGNOSIS — G47.33 OBSTRUCTIVE SLEEP APNEA (ADULT) (PEDIATRIC): Primary | ICD-10-CM

## 2021-04-27 DIAGNOSIS — G47.9 SLEEP DISORDER, UNSPECIFIED: ICD-10-CM

## 2021-04-27 DIAGNOSIS — F51.12 INSUFFICIENT SLEEP SYNDROME: ICD-10-CM

## 2021-04-27 DIAGNOSIS — G47.10 HYPERSOMNIA, UNSPECIFIED: ICD-10-CM

## 2021-04-27 DIAGNOSIS — F45.8 OTHER SOMATOFORM DISORDERS: ICD-10-CM

## 2021-04-27 DIAGNOSIS — E66.9 OBESITY, UNSPECIFIED: ICD-10-CM

## 2021-05-04 DIAGNOSIS — K21.9 GASTROESOPHAGEAL REFLUX DISEASE WITHOUT ESOPHAGITIS: ICD-10-CM

## 2021-05-04 RX ORDER — OMEPRAZOLE 20 MG/1
20 CAPSULE, DELAYED RELEASE ORAL
Qty: 30 CAPSULE | Refills: 3 | Status: SHIPPED | OUTPATIENT
Start: 2021-05-04 | End: 2021-08-10 | Stop reason: SDUPTHER

## 2021-05-05 ENCOUNTER — OFFICE VISIT (OUTPATIENT)
Dept: SLEEP CENTER | Facility: CLINIC | Age: 41
End: 2021-05-05
Payer: COMMERCIAL

## 2021-05-05 VITALS
HEIGHT: 67 IN | SYSTOLIC BLOOD PRESSURE: 126 MMHG | DIASTOLIC BLOOD PRESSURE: 78 MMHG | WEIGHT: 225.6 LBS | HEART RATE: 69 BPM | BODY MASS INDEX: 35.41 KG/M2

## 2021-05-05 DIAGNOSIS — G47.9 SLEEP DISORDER, UNSPECIFIED: ICD-10-CM

## 2021-05-05 DIAGNOSIS — E66.9 OBESITY, UNSPECIFIED: ICD-10-CM

## 2021-05-05 DIAGNOSIS — G47.10 HYPERSOMNIA, UNSPECIFIED: ICD-10-CM

## 2021-05-05 DIAGNOSIS — G47.33 OBSTRUCTIVE SLEEP APNEA (ADULT) (PEDIATRIC): ICD-10-CM

## 2021-05-05 PROCEDURE — 99214 OFFICE O/P EST MOD 30 MIN: CPT | Performed by: INTERNAL MEDICINE

## 2021-05-05 PROCEDURE — 1036F TOBACCO NON-USER: CPT | Performed by: INTERNAL MEDICINE

## 2021-05-05 PROCEDURE — 3008F BODY MASS INDEX DOCD: CPT | Performed by: INTERNAL MEDICINE

## 2021-05-05 NOTE — PROGRESS NOTES
Assessment/Plan:     Diagnoses and all orders for this visit:    Obstructive sleep apnea (adult) (pediatric)  -     Ambulatory referral to Sleep Medicine  -     PAP DME Resupply/Reorder    Hypersomnia, unspecified  -     Ambulatory referral to Sleep Medicine    Sleep disorder, unspecified  -     Ambulatory referral to Sleep Medicine    Obesity, unspecified  -     Ambulatory referral to Sleep Medicine        Excessive daytime sleepiness likely secondary to his obstructive sleep apnea  Reviewed his prior home sleep study with mild obstructive sleep apnea with an AHI of 9 4   Etiology pathogenesis of obstructive sleep apnea discussed in detail   Consequences of untreated sleep apnea discussed with excessive daytime sleepiness, increased risk for cardiovascular events as well as atrial fibrillation understands and verbalizes   Given his excessive daytime sleepiness with [place] sleepiness score of 17 today he would benefit from the treatment for the obstructive sleep apnea I have discussed with him the various treatment options with mandible advancing appliance uvula pharyngoplasty as well as the PAP use   He states he wants to go back to the CPAP, he states that he did not use it in the past because of his fullface mask now he wants to switch to a different mask and use it and he is motivated to use it now  His machine has been set at an auto between 6-16 cm I have discussed with the respiratory therapist regarding his the supplies as well as switching the mask to a nasal cradle which he states he prefers because his wife has recently received is similar interface he states,  He also states he keeps his the mouth open for which he will obtain a chinstrap      Also discussed regarding using a saline nasal spray 1 spray into each nostril prior to putting on the PAP machine  Need for compliance with the CPAP machine discussed   He will start using the CPAP machine and if there is any concerns he will give the office a call   Cleaning of the supplies and change of CPAP supplies discussed  ALSO DISCUSSED THAT WILL SEE HIM BACK IN 3 MONTHS WITH THE CPAP DOWNLOAD COMPLIANCE AND IF STILL WITH EXCESSIVE DAYTIME SLEEPINESS HE WOULD BENEFIT FROM A CPAP TITRATION STUDY FOLLOWED BY A MULTIPLE SLEEP LATENCY TESTING TO EVALUATE FOR CENTRAL CAUSES OF THE DAYTIME SLEEPINESS  WILL FOLLOW-UP IN 3 MONTHS OR P R N  EARLIER AS NEEDED  No follow-ups on file  All questions are answered to the patient's satisfaction and understanding  He verbalizes understanding  He is encouraged to call with any further questions or concerns  Portions of the record may have been created with voice recognition software  Occasional wrong word or "sound a like" substitutions may have occurred due to the inherent limitations of voice recognition software  Read the chart carefully and recognize, using context, where substitutions have occurred  a    Electronically Signed by Ivanna Chauhan MD    ______________________________________________________________________    Chief Complaint:   Chief Complaint   Patient presents with    Consult        Patient ID: David Lawson is a 42 y o  y o  male has a past medical history of Migraine with aura  5/5/2021  Patient presents today for initial visit  Patient is a very pleasant 78-year-old gentleman, who works as a , 6:45 a m  to 2:45 p m  Has a 20 minutes commute to work each way  He was evaluated for excessive daytime sleepiness in 2018 by home sleep study and was found to have mild obstructive sleep apnea and was given an auto CPAP  He states he did not use it long he only used it for about a week and then stopped it  Because he did not like the mask and recently for the past year has been having worsening symptoms with loud snoring witnessed apneic spells choking and gasping for air at night, for which he has return for evaluation  His daily sleep schedule is he goes to bed at around 11:00 p m  falls asleep in 15 minutes and he is out of bed at 5:40 a m  has 1-2 nocturnal awakenings for nocturia as well as occasional choking and gasping for air, when his out of the bed he states he is refreshed but as the day progresses is sleepy especially in the evening after 5:00 p m , does take a nap for about 10-15 minutes we do a in the evening between 430-5 p m   No symptoms related to restless legs  Occupational/Exposure history:      Review of Systems    Genitourinary none   Cardiology none   Gastrointestinal frequent heartburn/acid reflux   Neurology need to move extremities, numbness/tingling of an extremity, forgetfulness, poor concentration or confusion,  and difficulty with memory   Constitutional fatigue and weight change   Integumentary rash or dry skin and itching   Psychiatry none   Musculoskeletal none   Pulmonary chest tightness, snoring and difficulty breathing when lying flat    ENT throat clearing   Endocrine none   Hematological none     Social history: He reports that he has never smoked  He has never used smokeless tobacco  He reports current alcohol use  He reports that he does not use drugs      Past surgical history:   Past Surgical History:   Procedure Laterality Date    NO PAST SURGERIES       Family history:   Family History   Problem Relation Age of Onset    Hypertension Mother     Hyperlipidemia Mother     Heart attack Mother     Stroke Family     No Known Problems Father        Immunization History   Administered Date(s) Administered    Influenza, injectable, quadrivalent, preservative free 0 5 mL 11/06/2019, 09/14/2020    Tdap 03/26/2013     Current Outpatient Medications   Medication Sig Dispense Refill    clotrimazole-betamethasone (LOTRISONE) 1-0 05 % cream Apply topically 2 (two) times a day 45 g 0    omeprazole (PriLOSEC) 20 mg delayed release capsule Take 1 capsule (20 mg total) by mouth daily before breakfast 30 capsule 3     No current facility-administered medications for this visit  Allergies: Patient has no known allergies  Objective:  Vitals:    05/05/21 1427   BP: 126/78   Pulse: 69   Weight: 102 kg (225 lb 9 6 oz)   Height: 5' 6 54" (1 69 m)        Wt Readings from Last 3 Encounters:   05/05/21 102 kg (225 lb 9 6 oz)   10/06/20 101 kg (221 lb 12 8 oz)   09/14/20 102 kg (224 lb 3 2 oz)     Body mass index is 35 83 kg/m²  Physical Exam  Vitals signs and nursing note reviewed  Constitutional:       Appearance: He is well-developed  HENT:      Head: Normocephalic and atraumatic  Eyes:      Conjunctiva/sclera: Conjunctivae normal       Pupils: Pupils are equal, round, and reactive to light  Neck:      Musculoskeletal: Normal range of motion and neck supple  Thyroid: No thyromegaly  Vascular: No JVD  Cardiovascular:      Rate and Rhythm: Normal rate and regular rhythm  Heart sounds: Normal heart sounds  No murmur  No friction rub  No gallop  Pulmonary:      Effort: Pulmonary effort is normal  No respiratory distress  Breath sounds: Normal breath sounds  No wheezing or rales  Chest:      Chest wall: No tenderness  Musculoskeletal: Normal range of motion  General: No tenderness or deformity  Lymphadenopathy:      Cervical: No cervical adenopathy  Skin:     General: Skin is warm and dry  Neurological:      Mental Status: He is alert and oriented to person, place, and time  Diagnostics:  I have personally reviewed pertinent reports           ESS: Total score: 17

## 2021-05-06 ENCOUNTER — TELEPHONE (OUTPATIENT)
Dept: SLEEP CENTER | Facility: CLINIC | Age: 41
End: 2021-05-06

## 2021-08-10 ENCOUNTER — OFFICE VISIT (OUTPATIENT)
Dept: FAMILY MEDICINE CLINIC | Facility: CLINIC | Age: 41
End: 2021-08-10
Payer: COMMERCIAL

## 2021-08-10 VITALS
HEART RATE: 71 BPM | TEMPERATURE: 97.9 F | BODY MASS INDEX: 35.44 KG/M2 | SYSTOLIC BLOOD PRESSURE: 126 MMHG | HEIGHT: 67 IN | WEIGHT: 225.8 LBS | DIASTOLIC BLOOD PRESSURE: 90 MMHG | OXYGEN SATURATION: 98 %

## 2021-08-10 DIAGNOSIS — B35.4 TINEA CORPORIS: ICD-10-CM

## 2021-08-10 DIAGNOSIS — K21.9 GASTROESOPHAGEAL REFLUX DISEASE WITHOUT ESOPHAGITIS: ICD-10-CM

## 2021-08-10 DIAGNOSIS — S20.211A CONTUSION OF RIGHT CHEST WALL, INITIAL ENCOUNTER: Primary | ICD-10-CM

## 2021-08-10 PROCEDURE — 99214 OFFICE O/P EST MOD 30 MIN: CPT | Performed by: FAMILY MEDICINE

## 2021-08-10 RX ORDER — METHOCARBAMOL 750 MG/1
750 TABLET, FILM COATED ORAL EVERY 6 HOURS PRN
Qty: 30 TABLET | Refills: 2 | Status: SHIPPED | OUTPATIENT
Start: 2021-08-10

## 2021-08-10 RX ORDER — CLOTRIMAZOLE AND BETAMETHASONE DIPROPIONATE 10; .64 MG/G; MG/G
CREAM TOPICAL 2 TIMES DAILY
Qty: 45 G | Refills: 2 | Status: SHIPPED | OUTPATIENT
Start: 2021-08-10

## 2021-08-10 RX ORDER — OMEPRAZOLE 20 MG/1
20 CAPSULE, DELAYED RELEASE ORAL
Qty: 30 CAPSULE | Refills: 3 | Status: SHIPPED | OUTPATIENT
Start: 2021-08-10 | End: 2022-02-14 | Stop reason: SDUPTHER

## 2021-08-10 RX ORDER — NAPROXEN 500 MG/1
500 TABLET ORAL 2 TIMES DAILY WITH MEALS
Qty: 30 TABLET | Refills: 0 | Status: SHIPPED | OUTPATIENT
Start: 2021-08-10

## 2021-08-10 NOTE — ASSESSMENT & PLAN NOTE
Patient with contusion of right anterior chest wall  Xray reviewed and shows no acute fracture  Patient was instructed to start Naprosyn BID with meals  He may also take Robaxin as needed for pain relief  He was counseled that due to the injury his symptoms may take up to 4 weeks to resolve  Avoid activities that worsen pain such as lifting and pushing  Follow up if symptoms worsen

## 2021-08-10 NOTE — PROGRESS NOTES
Assessment/Plan:    1  Contusion of right chest wall, initial encounter  Assessment & Plan:  Patient with contusion of right anterior chest wall  Xray reviewed and shows no acute fracture  Patient was instructed to start Naprosyn BID with meals  He may also take Robaxin as needed for pain relief  He was counseled that due to the injury his symptoms may take up to 4 weeks to resolve  Avoid activities that worsen pain such as lifting and pushing  Follow up if symptoms worsen  Orders:  -     naproxen (NAPROSYN) 500 mg tablet; Take 1 tablet (500 mg total) by mouth 2 (two) times a day with meals  -     methocarbamol (ROBAXIN) 750 mg tablet; Take 1 tablet (750 mg total) by mouth every 6 (six) hours as needed for muscle spasms    2  Gastroesophageal reflux disease without esophagitis  Assessment & Plan:  Patient with symptoms of GERD, well controlled on Omeprazole  Continue medication  Orders:  -     omeprazole (PriLOSEC) 20 mg delayed release capsule; Take 1 capsule (20 mg total) by mouth daily before breakfast    3  Tinea corporis  Assessment & Plan:  Patient with skin condition on right knee that appears to be fungal    He was instructed to apply lotrisone cream twice daily for 4 weeks for treatment  If symptoms do not resolve, follow up with dermatologist       Orders:  -     clotrimazole-betamethasone (LOTRISONE) 1-0 05 % cream; Apply topically 2 (two) times a day For 4 weeks  -     Ambulatory referral to Dermatology; Future      Subjective:      Patient ID: Jessica Hatchet is a 42 y o  male  HPI    Patient went to the ER on 8/7/21 after he had chest wall injury  He was playing with his dog the day before when he hit his right chest on the ottoman and heard a popping noise  Xray shows no fracture  He has been taking Ibuprofen and Tylenol as needed for pain relief  He is not able ot sleep at night due to the pain because it hurts to lay on the back    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  He denies pain with deep inhalation  No shortness of breath  He was doing dishes and mopping the floor yesterday which made the pain worse  Patient states that he also has a rash on his leg which has not improved  He was diagnosed with fungal infection by his PCP last year and was prescribed lotrisone  He admits that he did not put the cream on as directed, but was using it intermittently  The rash has not worsened or gotten larger but it is still present  The following portions of the patient's history were reviewed and updated as appropriate: allergies, current medications, past family history, past medical history, past social history, past surgical history, and problem list       Current Outpatient Medications:     omeprazole (PriLOSEC) 20 mg delayed release capsule, Take 1 capsule (20 mg total) by mouth daily before breakfast, Disp: 30 capsule, Rfl: 3    clotrimazole-betamethasone (LOTRISONE) 1-0 05 % cream, Apply topically 2 (two) times a day For 4 weeks, Disp: 45 g, Rfl: 2    methocarbamol (ROBAXIN) 750 mg tablet, Take 1 tablet (750 mg total) by mouth every 6 (six) hours as needed for muscle spasms, Disp: 30 tablet, Rfl: 2    naproxen (NAPROSYN) 500 mg tablet, Take 1 tablet (500 mg total) by mouth 2 (two) times a day with meals, Disp: 30 tablet, Rfl: 0      Review of Systems   Constitutional: Negative for chills and fever  HENT: Negative for ear pain and sore throat  Eyes: Negative for pain and visual disturbance  Respiratory: Negative for cough and shortness of breath  Cardiovascular: Positive for chest pain  Negative for palpitations  Gastrointestinal: Negative for abdominal pain and vomiting  Genitourinary: Negative for dysuria and hematuria  Musculoskeletal: Negative for arthralgias and back pain  Skin: Positive for rash  Negative for color change  Neurological: Negative for seizures and syncope     All other systems reviewed and are negative  Objective:      /90 (BP Location: Left arm, Patient Position: Sitting, Cuff Size: Large)   Pulse 71   Temp 97 9 °F (36 6 °C) (Tympanic)   Ht 5' 6 54" (1 69 m)   Wt 102 kg (225 lb 12 8 oz)   SpO2 98%   BMI 35 86 kg/m²          Physical Exam  Vitals and nursing note reviewed  Constitutional:       General: He is not in acute distress  Appearance: Normal appearance  He is not ill-appearing  HENT:      Head: Normocephalic and atraumatic  Eyes:      Extraocular Movements: Extraocular movements intact  Conjunctiva/sclera: Conjunctivae normal    Cardiovascular:      Rate and Rhythm: Normal rate and regular rhythm  Heart sounds: Normal heart sounds  No murmur heard  Pulmonary:      Effort: Pulmonary effort is normal  No respiratory distress  Breath sounds: Normal breath sounds  No wheezing  Chest:      Chest wall: Tenderness present  No deformity or swelling  Musculoskeletal:      Cervical back: Normal range of motion  Skin:     General: Skin is warm  Findings: Rash present  Neurological:      General: No focal deficit present  Mental Status: He is alert and oriented to person, place, and time  Psychiatric:         Mood and Affect: Mood normal          Behavior: Behavior normal          Thought Content:  Thought content normal

## 2021-08-10 NOTE — ASSESSMENT & PLAN NOTE
Patient with skin condition on right knee that appears to be fungal    He was instructed to apply lotrisone cream twice daily for 4 weeks for treatment    If symptoms do not resolve, follow up with dermatologist

## 2022-02-14 DIAGNOSIS — K21.9 GASTROESOPHAGEAL REFLUX DISEASE WITHOUT ESOPHAGITIS: ICD-10-CM

## 2022-02-14 RX ORDER — OMEPRAZOLE 20 MG/1
20 CAPSULE, DELAYED RELEASE ORAL
Qty: 30 CAPSULE | Refills: 1 | Status: SHIPPED | OUTPATIENT
Start: 2022-02-14 | End: 2022-06-30 | Stop reason: SDUPTHER

## 2022-02-14 NOTE — TELEPHONE ENCOUNTER
[facility]          Chief Complaint:  VIJAYA     Subjective:     History of Present Illness:  Patient is a 75 y.o. Woman who presents for outpatient EGD and colonoscopy     PMH:  Past Medical History:   Diagnosis Date    Abnormal gait 7/5/2016    Acute serous otitis media of left ear     Anxiety     Bilateral carpal tunnel syndrome 7/5/2016    Chronic abdominal pain 7/5/2016    Dementia due to Parkinson's disease without behavioral disturbance ([facility] [address] 7/5/2016    Depression     managed by meds    Diverticulitis     Gastritis     GERD (gastroesophageal reflux disease)     Heart murmur     followed by [facility]ology. last echo10/2015    Hypercholesterolemia     Hypertension     does not require medication    Nausea & vomiting     Neuropathic pain 7/5/2016    Nontoxic multinodular goiter 7/5/2016    Orthostatic hypotension     managed by meds    Parkinson's disease ([facility] [address]     dx 2014- sometimes requires walker or wheelchair when tired    Peripheral neuropathy     Syncope 10/10/2015    Type 2 diabetes mellitus without complication     Urinary frequency 7/5/2016    Vaginal bleeding     Weight loss, unintentional     50 lbs over 2 yrs       PSH:  Past Surgical History:   Procedure Laterality Date    COLONOSCOPY N/A 8/6/2018    COLONOSCOPY/ 19 performed by Yesenia Duggan MD at [facility] TUBAL LIGATION  1974       Allergies:   Allergies   Allergen Reactions    Flagyl [Metronidazole] Other (comments)     Upset her stomach, nausea    Nsaids (Non-Steroidal Anti-Inflammatory Drug) Diarrhea    Peanut Diarrhea    Aspirin Nausea and Vomiting    Contrast Agent [Iodine] Nausea and Vomiting    Gabapentin Nausea Only    Gluten Other (comments)     Pt is not allergic to gluten - gluten is the only thing she can eat - per pt  *Remove from allergy list per request of pre-assessment RN - 11/29/16    Last OV 8/10/21    No future appts Iodinated Contrast- Oral And Iv Dye Diarrhea    Milk Containing Products Other (comments)    Morphine Drowsiness    Omnipaque Rediflo [Iohexol] Other (comments)     Stomach upset.  Red Dye Other (comments)     Upset stomach.  Wheat Diarrhea       Home Medications:  Prior to Admission medications    Medication Sig Start Date End Date Taking? Authorizing Provider   famotidine (PEPCID) 20 mg tablet Take 20 mg by mouth daily. Yes Provider, Historical   esomeprazole (NEXIUM) 40 mg capsule Take 40 mg by mouth two (2) times a day. Yes Provider, Historical   cyanocobalamin (VITAMIN B-12) 1,000 mcg/mL injection 1,000 mcg by IntraMUSCular route every thirty (30) days. Yes Provider, Historical   dicyclomine (BENTYL) 10 mg capsule Take 10 mg by mouth 4 times daily (before meals and nightly). Yes Provider, Historical   ondansetron hcl (ZOFRAN) 8 mg tablet Take 8 mg by mouth every eight (8) hours as needed for Nausea. Yes Provider, Historical   carbidopa-levodopa (PARCOPA)  mg rapid dissolve tablet Take 2 Tabs by mouth Before meals, after meals and at bedtime. Indications: IDIOPATHIC PARKINSONISM   Yes Provider, Historical   ALPRAZolam (XANAX) 0.5 mg tablet Take 1 Tab by mouth three (3) times daily as needed for Anxiety. Max Daily Amount: 1.5 mg. 9/11/18  Yes Luz Marina Viramontes NP   loratadine (CLARITIN) 10 mg tablet Take 10 mg by mouth daily as needed. Yes Other, MD Paulette   methIMAzole (TAPAZOLE) 10 mg tablet Take 5 mg by mouth daily. 9/17/17  Yes Provider, Historical   polyethylene glycol (MIRALAX) 17 gram packet Take 17 g by mouth as needed. Indications: Constipation   Yes Provider, Historical   midodrine (PROAMITINE) 2.5 mg tablet Take  by mouth daily as needed. Indications: feeling dizzy upon standing from blood pressure drop    Provider, Historical   docusate sodium (COLACE) 50 mg capsule Take 50 mg by mouth two (2) times daily as needed.     Other, MD Paulette       Hospital Medications:  Current Facility-Administered Medications   Medication Dose Route Frequency    lactated Ringers infusion  100 mL/hr IntraVENous CONTINUOUS    lactated Ringers infusion  25 mL/hr IntraVENous CONTINUOUS       Social History:  Social History     Tobacco Use    Smoking status: Never Smoker    Smokeless tobacco: Never Used   Substance Use Topics    Alcohol use: No     Pt denies any history of IV drug use, blood transfusions, tattoos     Family History:  Family History   Problem Relation Age of Onset    Alzheimer Mother     Diabetes Father     Cancer Father         prostate    Stroke Father     Diabetes Sister     Diabetes Brother     Cancer Brother         kidney and prostate    Sickle Cell Anemia Son         2 sons     Breast Cancer Neg Hx        Review of Systems:  A detailed 10 system ROS is obtained, with pertinent positives as listed above. All others are negative. Diet:      Objective:     Physical Exam:  Vitals:  Visit Vitals  /58   Pulse 76   Temp 97.5 °F (36.4 °C)   Resp 14   Ht 5' 6\" (1.676 m)   Wt 48.5 kg (107 lb)   SpO2 98%   Breastfeeding? No   BMI 17.27 kg/m²     Gen:  Pt is alert, cooperative, no acute distress  Skin:  Extremities and face reveal no rashes. HEENT: Sclerae anicteric. Extra-occular muscles are intact. No oral ulcers. The neck is supple. Cardiovascular: Regular rate and rhythm. No murmurs, gallops, or rubs. Respiratory:  Comfortable breathing with no accessory muscle use. Clear breath sounds anteriorly with no wheezes, rales, or rhonchi. GI:  Abdomen nondistended, soft, and nontender. Normal active bowel sounds. No masses palpable. Musculoskeletal:  Extremities have good range of motion. No costovertebral tenderness. Neurological:  Parkinsonism   Psychiatric:  Mood appears appropriate with judgement intact. Lymphatic:  No cervical or supraclavicular adenopathy. Assessment:       Active Problems:    * No active hospital problems.  *      Plan:       Proceed to EGD and colonoscopy as planned.  ASA III

## 2022-06-30 DIAGNOSIS — K21.9 GASTROESOPHAGEAL REFLUX DISEASE WITHOUT ESOPHAGITIS: ICD-10-CM

## 2022-06-30 RX ORDER — OMEPRAZOLE 20 MG/1
20 CAPSULE, DELAYED RELEASE ORAL
Qty: 30 CAPSULE | Refills: 1 | Status: SHIPPED | OUTPATIENT
Start: 2022-06-30

## 2022-06-30 NOTE — TELEPHONE ENCOUNTER
Thu Mills from [address] called requesting refill for the following medication-    Omeprazole 20mg    Last refill: 02/14/2022  Last OV: 08/10/2021  Next OV: not scheduled

## 2022-08-02 ENCOUNTER — OFFICE VISIT (OUTPATIENT)
Dept: FAMILY MEDICINE CLINIC | Facility: CLINIC | Age: 42
End: 2022-08-02
Payer: COMMERCIAL

## 2022-08-02 VITALS
WEIGHT: 219.6 LBS | TEMPERATURE: 98.1 F | SYSTOLIC BLOOD PRESSURE: 120 MMHG | BODY MASS INDEX: 33.28 KG/M2 | HEART RATE: 69 BPM | OXYGEN SATURATION: 97 % | HEIGHT: 68 IN | DIASTOLIC BLOOD PRESSURE: 90 MMHG

## 2022-08-02 DIAGNOSIS — S81.811A SKIN TEAR OF RIGHT LOWER LEG WITHOUT COMPLICATION, INITIAL ENCOUNTER: ICD-10-CM
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
## 2022-08-02 PROCEDURE — 3725F SCREEN DEPRESSION PERFORMED: CPT | Performed by: FAMILY MEDICINE

## 2022-08-02 PROCEDURE — 99214 OFFICE O/P EST MOD 30 MIN: CPT | Performed by: FAMILY MEDICINE

## 2022-08-02 RX ORDER — AMOXICILLIN AND CLAVULANATE POTASSIUM 875; 125 MG/1; MG/1
TABLET, FILM COATED ORAL
COMMUNITY
Start: 2022-08-01 | End: 2022-08-08 | Stop reason: SDUPTHER

## 2022-08-02 NOTE — PROGRESS NOTES
Assessment/Plan:   1  Dog bite, initial encounter  Patient's what appears stable today  At this time, he was advised on importance of continue with his antibiotics  He was also instructed on proper wound care  Will follow up with patient in 10 days for suture removal   If needed, will continue antibiotics at that time  He was advised that he would highly benefit from taking this week off however he would like to consider returning on Thursday  There are no diagnoses linked to this encounter  Subjective:       Chief Complaint   Patient presents with    Follow-up     Follow up from dog bite 7/31/22, right big toe      Patient ID: Patricia Campos is a 36 y o  male presents today for an ED follow-up  He was seen in the ED on July 31st   He sustained a dog bite over his right great toe  Patient did sustain a significant laceration over his toe  He was taken to the ED  In the ED, his wound was cleaned and sutured  He did receive a Adacel vaccine and was also placed on antibiotics of Augmentin  He has been continuing with wound care at home  He denies any fevers or chills  HPI    Review of Systems   Constitutional: Negative for activity change, chills, fatigue and fever  HENT: Negative for congestion, ear pain, sinus pressure and sore throat  Eyes: Negative for redness, itching and visual disturbance  Respiratory: Negative for cough and shortness of breath  Cardiovascular: Negative for chest pain and palpitations  Gastrointestinal: Negative for abdominal pain, diarrhea and nausea  Endocrine: Negative for cold intolerance and heat intolerance  Genitourinary: Negative for dysuria, flank pain and frequency  Musculoskeletal: Negative for arthralgias, back pain, gait problem and myalgias  Skin: Negative for color change  Allergic/Immunologic: Negative for environmental allergies  Neurological: Negative for dizziness, numbness and headaches     Psychiatric/Behavioral: Negative for behavioral problems and sleep disturbance  The following portions of the patient's history were reviewed and updated as appropriate : past family history, past medical history, past social history and past surgical history  Current Outpatient Medications:     amoxicillin-clavulanate (AUGMENTIN) 875-125 mg per tablet, , Disp: , Rfl:     omeprazole (PriLOSEC) 20 mg delayed release capsule, Take 1 capsule (20 mg total) by mouth daily before breakfast, Disp: 30 capsule, Rfl: 1    clotrimazole-betamethasone (LOTRISONE) 1-0 05 % cream, Apply topically 2 (two) times a day For 4 weeks (Patient not taking: Reported on 8/2/2022), Disp: 45 g, Rfl: 2    methocarbamol (ROBAXIN) 750 mg tablet, Take 1 tablet (750 mg total) by mouth every 6 (six) hours as needed for muscle spasms (Patient not taking: Reported on 8/2/2022), Disp: 30 tablet, Rfl: 2    naproxen (NAPROSYN) 500 mg tablet, Take 1 tablet (500 mg total) by mouth 2 (two) times a day with meals (Patient not taking: Reported on 8/2/2022), Disp: 30 tablet, Rfl: 0         Objective:         Vitals:    08/02/22 1523   BP: 120/90   BP Location: Left arm   Patient Position: Sitting   Cuff Size: Adult   Pulse: 69   Temp: 98 1 °F (36 7 °C)   SpO2: 97%   Weight: 99 6 kg (219 lb 9 6 oz)   Height: 5' 7 72" (1 72 m)     Physical Exam  Vitals reviewed  Constitutional:       Appearance: Normal appearance  He is well-developed  HENT:      Head: Normocephalic and atraumatic  Right Ear: Hearing normal       Left Ear: Hearing normal       Nose: Nose normal  No septal deviation  Eyes:      General: Lids are normal       Pupils: Pupils are equal, round, and reactive to light  Neck:      Thyroid: No thyroid mass or thyromegaly  Trachea: Trachea normal    Cardiovascular:      Rate and Rhythm: Normal rate  Pulmonary:      Effort: Pulmonary effort is normal  No respiratory distress  Breath sounds: No decreased breath sounds     Abdominal: Tenderness: There is no guarding  Musculoskeletal:         General: Normal range of motion  Cervical back: Normal range of motion  Feet:    Skin:     General: Skin is warm and dry  Neurological:      Mental Status: He is alert and oriented to person, place, and time  Cranial Nerves: No cranial nerve deficit  Sensory: No sensory deficit  Psychiatric:         Speech: Speech normal          Behavior: Behavior normal          Thought Content:  Thought content normal          Judgment: Judgment normal

## 2022-08-08 ENCOUNTER — OFFICE VISIT (OUTPATIENT)
Dept: FAMILY MEDICINE CLINIC | Facility: CLINIC | Age: 42
End: 2022-08-08
Payer: COMMERCIAL

## 2022-08-08 VITALS
HEART RATE: 99 BPM | OXYGEN SATURATION: 100 % | WEIGHT: 218 LBS | BODY MASS INDEX: 33.04 KG/M2 | HEIGHT: 68 IN | SYSTOLIC BLOOD PRESSURE: 110 MMHG | DIASTOLIC BLOOD PRESSURE: 90 MMHG | TEMPERATURE: 98.2 F
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
## 2022-08-08 DIAGNOSIS — S81.811A SKIN TEAR OF RIGHT LOWER LEG WITHOUT COMPLICATION, INITIAL ENCOUNTER: ICD-10-CM

## 2022-08-08 PROCEDURE — 99214 OFFICE O/P EST MOD 30 MIN: CPT | Performed by: FAMILY MEDICINE

## 2022-08-08 RX ORDER — AMOXICILLIN AND CLAVULANATE POTASSIUM 875; 125 MG/1; MG/1
1 TABLET, FILM COATED ORAL EVERY 12 HOURS SCHEDULED
Qty: 10 TABLET | Refills: 0 | Status: SHIPPED | OUTPATIENT
Start: 2022-08-08 | End: 2022-08-13

## 2022-08-09 NOTE — PROGRESS NOTES
Assessment/Plan:   1  Dog bite, initial encounterSkin tear of right lower leg without complication, initial encounter  Reviewed patient's symptoms today  At this time, symptoms appear overall stable  Will continue with his Augmentin for another 5 days  At this time, he was reassured that this is normal tissue healing  Continue with proper wound care  Will follow up with patient on Thursday for suture removal   - amoxicillin-clavulanate (AUGMENTIN) 875-125 mg per tablet; Take 1 tablet by mouth every 12 (twelve) hours for 5 days  Dispense: 10 tablet; Refill: 0             Diagnoses and all orders for this visit:    Dog bite, initial encounter  -     amoxicillin-clavulanate (AUGMENTIN) 875-125 mg per tablet; Take 1 tablet by mouth every 12 (twelve) hours for 5 days    Skin tear of right lower leg without complication, initial encounter  -     amoxicillin-clavulanate (AUGMENTIN) 875-125 mg per tablet; Take 1 tablet by mouth every 12 (twelve) hours for 5 days          Subjective:       Chief Complaint   Patient presents with    Follow-up     Dog bite concerns      Patient ID: Karen Carrillo is a 36 y o  male presents today for a follow-up from his recent dog bite  Since his last visit, he has been applying antibiotic ointment over this area  He has been also taking his oral antibiotics regularly  He does have concern secondary to the drainage today as well as the year the month and the area as well  He has 2 more days of antibiotics  HPI    Review of Systems   Constitutional: Negative for activity change, chills, fatigue and fever  HENT: Negative for congestion, ear pain, sinus pressure and sore throat  Eyes: Negative for redness, itching and visual disturbance  Respiratory: Negative for cough and shortness of breath  Cardiovascular: Negative for chest pain and palpitations  Gastrointestinal: Negative for abdominal pain, diarrhea and nausea     Endocrine: Negative for cold intolerance and heat intolerance  Genitourinary: Negative for dysuria, flank pain and frequency  Musculoskeletal: Negative for arthralgias, back pain, gait problem and myalgias  Skin: Negative for color change  Allergic/Immunologic: Negative for environmental allergies  Neurological: Negative for dizziness, numbness and headaches  Psychiatric/Behavioral: Negative for behavioral problems and sleep disturbance  The following portions of the patient's history were reviewed and updated as appropriate : past family history, past medical history, past social history and past surgical history  Current Outpatient Medications:     amoxicillin-clavulanate (AUGMENTIN) 875-125 mg per tablet, Take 1 tablet by mouth every 12 (twelve) hours for 5 days, Disp: 10 tablet, Rfl: 0    omeprazole (PriLOSEC) 20 mg delayed release capsule, Take 1 capsule (20 mg total) by mouth daily before breakfast, Disp: 30 capsule, Rfl: 1    clotrimazole-betamethasone (LOTRISONE) 1-0 05 % cream, Apply topically 2 (two) times a day For 4 weeks (Patient not taking: No sig reported), Disp: 45 g, Rfl: 2    methocarbamol (ROBAXIN) 750 mg tablet, Take 1 tablet (750 mg total) by mouth every 6 (six) hours as needed for muscle spasms (Patient not taking: No sig reported), Disp: 30 tablet, Rfl: 2    naproxen (NAPROSYN) 500 mg tablet, Take 1 tablet (500 mg total) by mouth 2 (two) times a day with meals (Patient not taking: No sig reported), Disp: 30 tablet, Rfl: 0         Objective:         Vitals:    08/08/22 1638   BP: 110/90   BP Location: Left arm   Patient Position: Sitting   Cuff Size: Large   Pulse: 99   Temp: 98 2 °F (36 8 °C)   TempSrc: Tympanic   SpO2: 100%   Weight: 98 9 kg (218 lb)   Height: 5' 7 72" (1 72 m)     Physical Exam  Vitals reviewed  Constitutional:       Appearance: Normal appearance  He is well-developed  HENT:      Head: Normocephalic and atraumatic        Right Ear: Hearing normal       Left Ear: Hearing normal       Nose: Nose normal  No septal deviation  Eyes:      General: Lids are normal       Pupils: Pupils are equal, round, and reactive to light  Neck:      Thyroid: No thyroid mass or thyromegaly  Trachea: Trachea normal    Cardiovascular:      Rate and Rhythm: Normal rate  Pulmonary:      Effort: Pulmonary effort is normal  No respiratory distress  Breath sounds: No decreased breath sounds  Abdominal:      Tenderness: There is no guarding  Musculoskeletal:         General: Normal range of motion  Cervical back: Normal range of motion  Feet:    Skin:     General: Skin is warm and dry  Neurological:      Mental Status: He is alert and oriented to person, place, and time  Cranial Nerves: No cranial nerve deficit  Sensory: No sensory deficit  Psychiatric:         Speech: Speech normal          Behavior: Behavior normal          Thought Content:  Thought content normal          Judgment: Judgment normal

## 2022-08-12 ENCOUNTER — OFFICE VISIT (OUTPATIENT)
Dept: FAMILY MEDICINE CLINIC | Facility: CLINIC | Age: 42
End: 2022-08-12
Payer: COMMERCIAL

## 2022-08-12 VITALS
RESPIRATION RATE: 18 BRPM | HEIGHT: 68 IN | OXYGEN SATURATION: 98 % | BODY MASS INDEX: 32.86 KG/M2 | WEIGHT: 216.8 LBS | DIASTOLIC BLOOD PRESSURE: 82 MMHG | HEART RATE: 69 BPM | TEMPERATURE: 98.5 F | SYSTOLIC BLOOD PRESSURE: 110 MMHG

## 2022-08-12 DIAGNOSIS — S81.811D SKIN TEAR OF RIGHT LOWER LEG WITHOUT COMPLICATION, SUBSEQUENT ENCOUNTER: Primary | ICD-10-CM

## 2022-08-12 PROBLEM — S81.811A SKIN TEAR OF RIGHT LOWER LEG WITHOUT COMPLICATION: Status: ACTIVE | Noted: 2022-08-12

## 2022-08-12 PROCEDURE — 99213 OFFICE O/P EST LOW 20 MIN: CPT | Performed by: FAMILY MEDICINE

## 2022-08-12 NOTE — LETTER
August 12, 2022     Patient: Evi Wakefield  YOB: 1980  Date of Visit: 8/12/2022      To Whom it May Concern:    Satinder Fulton is under my professional care  Lauro Beltran was seen in my office on 8/12/2022  Lauro Beltran may return to work on 8/15/22  If you have any questions or concerns, please don't hesitate to call           Sincerely,          Romero Mariee DO        CC: No Recipients

## 2022-08-12 NOTE — PROGRESS NOTES
Assessment/Plan:     1  Skin tear of right lower leg without complication, subsequent encounter  Assessment & Plan:  No signs of infection; sutures removed without complication; advised to complete antibiotics and counseled on wound care; f/u guidance given          Subjective:      Patient ID: Bev House is a 36 y o  male  Pt presents for suture removal from dog bite wound on 8/1/22  Pt completing course of Augmentin  Denies any fevers/chills, increase in swelling or discharge  Seems sutures are not holding together as they had before  Pt has been keeping area covered  Updated tdap  The following portions of the patient's history were reviewed and updated as appropriate: allergies, current medications, past family history, past medical history, past social history, past surgical history, and problem list     Review of Systems   Constitutional: Negative for chills and fever  Respiratory: Negative for shortness of breath  Cardiovascular: Negative for chest pain  Skin: Positive for wound  Objective:      /82 (BP Location: Right arm, Patient Position: Sitting, Cuff Size: Adult)   Pulse 69   Temp 98 5 °F (36 9 °C) (Temporal)   Resp 18   Ht 5' 7 75" (1 721 m)   Wt 98 3 kg (216 lb 12 8 oz)   SpO2 98%   BMI 33 21 kg/m²          Physical Exam  Vitals reviewed  Constitutional:       General: He is not in acute distress  Appearance: Normal appearance  He is not ill-appearing, toxic-appearing or diaphoretic  HENT:      Head: Normocephalic and atraumatic  Eyes:      General: No scleral icterus  Right eye: No discharge  Left eye: No discharge  Conjunctiva/sclera: Conjunctivae normal    Cardiovascular:      Rate and Rhythm: Normal rate and regular rhythm  Pulses: Normal pulses  Heart sounds: Normal heart sounds  No murmur heard  No gallop  Pulmonary:      Effort: Pulmonary effort is normal  No respiratory distress        Breath sounds: Normal breath sounds  No stridor  No wheezing, rhonchi or rales  Musculoskeletal:      Right lower leg: No edema  Left lower leg: No edema  Skin:         Neurological:      General: No focal deficit present  Mental Status: He is alert and oriented to person, place, and time  Psychiatric:         Mood and Affect: Mood normal          Behavior: Behavior normal          Thought Content: Thought content normal          Judgment: Judgment normal              Suture removal    Date/Time: 8/12/2022 12:31 PM  Performed by: Rigo Wang DO  Authorized by: Rigo Wang DO   [place] Protocol:  Procedure performed by:  Consent: Verbal consent obtained  Risks and benefits: risks, benefits and alternatives were discussed  Consent given by: patient  Time out: Immediately prior to procedure a "time out" was called to verify the correct patient, procedure, equipment, support staff and site/side marked as required  Timeout called at: 8/12/2022 12:18 PM   Patient understanding: patient states understanding of the procedure being performed  Patient consent: the patient's understanding of the procedure matches consent given  Procedure consent: procedure consent matches procedure scheduled  Required items: required blood products, implants, devices, and special equipment available  Patient identity confirmed: verbally with patient        Patient location:  Clinic  Location:     Laterality:  Right    Location:  Lower extremity    Lower extremity location:  Toe    Toe location:  R big toe  Procedure details: Tools used:  Suture removal kit    Wound appearance:  No sign(s) of infection and tender    Number of sutures removed:  6  Post-procedure details:     Post-removal:  Antibiotic ointment applied and Band-Aid applied    Patient tolerance of procedure:   Tolerated well, no immediate complications  Comments:      +small amount of ethyl chloride applied due to pain with suture removal

## 2022-08-12 NOTE — ASSESSMENT & PLAN NOTE
No signs of infection; sutures removed without complication; advised to complete antibiotics and counseled on wound care; f/u guidance given

## 2022-11-09 ENCOUNTER — OFFICE VISIT (OUTPATIENT)
Dept: FAMILY MEDICINE CLINIC | Facility: CLINIC | Age: 42
End: 2022-11-09

## 2022-11-09 ENCOUNTER — TELEPHONE (OUTPATIENT)
Dept: FAMILY MEDICINE CLINIC | Facility: CLINIC | Age: 42
End: 2022-11-09

## 2022-11-09 VITALS
HEIGHT: 68 IN | HEART RATE: 67 BPM | TEMPERATURE: 98.7 F | WEIGHT: 214 LBS | BODY MASS INDEX: 32.43 KG/M2 | OXYGEN SATURATION: 98 % | SYSTOLIC BLOOD PRESSURE: 110 MMHG | DIASTOLIC BLOOD PRESSURE: 80 MMHG

## 2022-11-09 DIAGNOSIS — H57.11 ACUTE RIGHT EYE PAIN: Primary | ICD-10-CM

## 2022-11-09 NOTE — PROGRESS NOTES
Assessment/Plan:     1  Acute right eye pain  Assessment & Plan:  Concern for possible uveitis given exam and symptoms vs elevated eye pressure, less consistent with abrasion given sx; advised urgent eye exam evaluation to further assess and tx; pt in agreement with plan           Subjective:      Patient ID: Mckenna Cousin is a 36 y o  male  Patient is here with concerns of right eye pain and redness  Started with irritation the last 3-4 days  Felt like something was in the eye but today progressively worsened  He states eye is painful and has pressure like feeling around eye  Today it is watering and has pain  He complains of light sensitivity  No double vision  No loss of vision but vision feels off when changing in lighting  This am used warm compress and had moisturizing drops without relief  No known trauma  Does have things at work that could have gotten in his eye but wears safety glasses and symptoms did not start surrounding his work  The following portions of the patient's history were reviewed and updated as appropriate: allergies, current medications, past family history, past medical history, past social history, past surgical history, and problem list     Review of Systems   Constitutional: Negative for chills and fever  Eyes: Positive for photophobia, pain, redness and visual disturbance  Negative for discharge and itching  Objective:      /80 (BP Location: Left arm, Patient Position: Sitting, Cuff Size: Large)   Pulse 67   Temp 98 7 °F (37 1 °C) (Temporal)   Ht 5' 7 75" (1 721 m)   Wt 97 1 kg (214 lb)   SpO2 98%   BMI 32 78 kg/m²          Physical Exam  Vitals reviewed  Constitutional:       General: He is not in acute distress  Appearance: Normal appearance  He is not ill-appearing, toxic-appearing or diaphoretic  HENT:      Head: Normocephalic and atraumatic  Eyes:      General: No scleral icterus  Right eye: No discharge           Left eye: No discharge  Extraocular Movements:      Right eye: Normal extraocular motion  Left eye: Normal extraocular motion  Conjunctiva/sclera:      Right eye: Right conjunctiva is injected  Chemosis present  Left eye: Left conjunctiva is not injected  No chemosis  Pupils: Pupils are unequal       Right eye: Pupil is round and reactive  Left eye: Pupil is round and reactive  Slit lamp exam:     Right eye: Photophobia present  Comments: Left dilated >than right but both reactive pupils   Cardiovascular:      Rate and Rhythm: Normal rate and regular rhythm  Pulses: Normal pulses  Heart sounds: Normal heart sounds  No murmur heard  No gallop  Pulmonary:      Effort: Pulmonary effort is normal  No respiratory distress  Breath sounds: Normal breath sounds  No stridor  No wheezing, rhonchi or rales  Musculoskeletal:      Right lower leg: No edema  Left lower leg: No edema  Neurological:      General: No focal deficit present  Mental Status: He is alert and oriented to person, place, and time  Psychiatric:         Mood and Affect: Mood normal          Behavior: Behavior normal          Thought Content:  Thought content normal          Judgment: Judgment normal

## 2022-11-09 NOTE — ASSESSMENT & PLAN NOTE
Concern for possible uveitis given exam and symptoms vs elevated eye pressure, less consistent with abrasion given sx; advised urgent eye exam evaluation to further assess and tx; pt in agreement with plan

## 2022-11-09 NOTE — TELEPHONE ENCOUNTER
Made an appointment for pt @ 3:30 11/9 with [facility] [address] for pt as per Dr Alie Yepez recommendation for her concern of uvetis and pressure

## 2024-02-26 ENCOUNTER — TELEPHONE (OUTPATIENT)
Dept: FAMILY MEDICINE CLINIC | Facility: CLINIC | Age: 44
End: 2024-02-26